# Patient Record
Sex: MALE | Race: WHITE | Employment: FULL TIME | URBAN - METROPOLITAN AREA
[De-identification: names, ages, dates, MRNs, and addresses within clinical notes are randomized per-mention and may not be internally consistent; named-entity substitution may affect disease eponyms.]

---

## 2017-08-24 ENCOUNTER — OFFICE VISIT (OUTPATIENT)
Dept: FAMILY MEDICINE CLINIC | Age: 42
End: 2017-08-24
Payer: COMMERCIAL

## 2017-08-24 VITALS
HEIGHT: 70 IN | SYSTOLIC BLOOD PRESSURE: 122 MMHG | WEIGHT: 272 LBS | HEART RATE: 86 BPM | BODY MASS INDEX: 38.94 KG/M2 | DIASTOLIC BLOOD PRESSURE: 68 MMHG | OXYGEN SATURATION: 98 % | TEMPERATURE: 98.2 F

## 2017-08-24 DIAGNOSIS — G89.29 CHRONIC RIGHT SHOULDER PAIN: Primary | ICD-10-CM

## 2017-08-24 DIAGNOSIS — M25.511 CHRONIC RIGHT SHOULDER PAIN: Primary | ICD-10-CM

## 2017-08-24 DIAGNOSIS — M67.90 NODULE OF TENDON SHEATH: ICD-10-CM

## 2017-08-24 DIAGNOSIS — Z76.89 ENCOUNTER TO ESTABLISH CARE: ICD-10-CM

## 2017-08-24 DIAGNOSIS — E66.9 OBESITY, UNSPECIFIED OBESITY SEVERITY, UNSPECIFIED OBESITY TYPE: ICD-10-CM

## 2017-08-24 DIAGNOSIS — Z00.00 HEALTH CARE MAINTENANCE: ICD-10-CM

## 2017-08-24 PROCEDURE — 99203 OFFICE O/P NEW LOW 30 MIN: CPT | Performed by: NURSE PRACTITIONER

## 2017-08-24 ASSESSMENT — ENCOUNTER SYMPTOMS
CONSTIPATION: 0
ABDOMINAL PAIN: 0
SHORTNESS OF BREATH: 0
COUGH: 0
DIARRHEA: 0
RESPIRATORY NEGATIVE: 1
WHEEZING: 0
ALLERGIC/IMMUNOLOGIC NEGATIVE: 1
GASTROINTESTINAL NEGATIVE: 1
EYES NEGATIVE: 1

## 2017-08-24 ASSESSMENT — PATIENT HEALTH QUESTIONNAIRE - PHQ9
SUM OF ALL RESPONSES TO PHQ QUESTIONS 1-9: 0
2. FEELING DOWN, DEPRESSED OR HOPELESS: 0
SUM OF ALL RESPONSES TO PHQ9 QUESTIONS 1 & 2: 0
1. LITTLE INTEREST OR PLEASURE IN DOING THINGS: 0

## 2017-09-05 ENCOUNTER — HOSPITAL ENCOUNTER (OUTPATIENT)
Age: 42
Setting detail: SPECIMEN
Discharge: HOME | End: 2017-09-05
Payer: COMMERCIAL

## 2017-09-05 DIAGNOSIS — Z00.00 HEALTH CARE MAINTENANCE: ICD-10-CM

## 2017-09-05 LAB
ANION GAP SERPL CALCULATED.3IONS-SCNC: 13 MMOL/L (ref 9–17)
BUN BLDV-MCNC: 8 MG/DL (ref 6–20)
BUN/CREAT BLD: ABNORMAL (ref 9–20)
CALCIUM SERPL-MCNC: 9.2 MG/DL (ref 8.6–10.4)
CHLORIDE BLD-SCNC: 109 MMOL/L (ref 98–107)
CHOLESTEROL/HDL RATIO: 3.2
CHOLESTEROL: 180 MG/DL
CO2: 25 MMOL/L (ref 20–31)
CREAT SERPL-MCNC: 0.78 MG/DL (ref 0.7–1.2)
GFR AFRICAN AMERICAN: >60 ML/MIN
GFR NON-AFRICAN AMERICAN: >60 ML/MIN
GFR SERPL CREATININE-BSD FRML MDRD: ABNORMAL ML/MIN/{1.73_M2}
GFR SERPL CREATININE-BSD FRML MDRD: ABNORMAL ML/MIN/{1.73_M2}
GLUCOSE BLD-MCNC: 98 MG/DL (ref 70–99)
HCT VFR BLD CALC: 39.4 % (ref 41–53)
HDLC SERPL-MCNC: 57 MG/DL
HEMOGLOBIN: 13.5 G/DL (ref 13.5–17.5)
LDL CHOLESTEROL: 100 MG/DL (ref 0–130)
MCH RBC QN AUTO: 31.3 PG (ref 26–34)
MCHC RBC AUTO-ENTMCNC: 34.4 G/DL (ref 31–37)
MCV RBC AUTO: 91.1 FL (ref 80–100)
PDW BLD-RTO: 14.7 % (ref 12.5–15.4)
PLATELET # BLD: 246 K/UL (ref 140–450)
PMV BLD AUTO: 8.4 FL (ref 6–12)
POTASSIUM SERPL-SCNC: 4.9 MMOL/L (ref 3.7–5.3)
RBC # BLD: 4.32 M/UL (ref 4.5–5.9)
SODIUM BLD-SCNC: 147 MMOL/L (ref 135–144)
TRIGL SERPL-MCNC: 114 MG/DL
VLDLC SERPL CALC-MCNC: NORMAL MG/DL (ref 1–30)
WBC # BLD: 5.7 K/UL (ref 3.5–11)

## 2017-09-08 DIAGNOSIS — M25.511 CHRONIC RIGHT SHOULDER PAIN: Primary | ICD-10-CM

## 2017-09-08 DIAGNOSIS — G89.29 CHRONIC RIGHT SHOULDER PAIN: Primary | ICD-10-CM

## 2017-09-11 ENCOUNTER — OFFICE VISIT (OUTPATIENT)
Dept: ORTHOPEDIC SURGERY | Age: 42
End: 2017-09-11
Payer: COMMERCIAL

## 2017-09-11 VITALS — BODY MASS INDEX: 38.95 KG/M2 | HEIGHT: 70 IN | WEIGHT: 272.05 LBS

## 2017-09-11 DIAGNOSIS — M75.41 SHOULDER IMPINGEMENT, RIGHT: Primary | ICD-10-CM

## 2017-09-11 PROCEDURE — 99243 OFF/OP CNSLTJ NEW/EST LOW 30: CPT | Performed by: ORTHOPAEDIC SURGERY

## 2017-09-11 PROCEDURE — 20610 DRAIN/INJ JOINT/BURSA W/O US: CPT | Performed by: ORTHOPAEDIC SURGERY

## 2017-09-11 RX ORDER — BUPIVACAINE HYDROCHLORIDE 2.5 MG/ML
2 INJECTION, SOLUTION INFILTRATION; PERINEURAL ONCE
Status: COMPLETED | OUTPATIENT
Start: 2017-09-11 | End: 2017-09-11

## 2017-09-11 RX ORDER — METHYLPREDNISOLONE ACETATE 80 MG/ML
80 INJECTION, SUSPENSION INTRA-ARTICULAR; INTRALESIONAL; INTRAMUSCULAR; SOFT TISSUE ONCE
Status: COMPLETED | OUTPATIENT
Start: 2017-09-11 | End: 2017-09-11

## 2017-09-11 RX ADMIN — METHYLPREDNISOLONE ACETATE 80 MG: 80 INJECTION, SUSPENSION INTRA-ARTICULAR; INTRALESIONAL; INTRAMUSCULAR; SOFT TISSUE at 14:36

## 2017-09-11 RX ADMIN — BUPIVACAINE HYDROCHLORIDE 5 MG: 2.5 INJECTION, SOLUTION INFILTRATION; PERINEURAL at 14:33

## 2017-09-11 ASSESSMENT — ENCOUNTER SYMPTOMS
DIARRHEA: 0
VOICE CHANGE: 0
WHEEZING: 0
SHORTNESS OF BREATH: 0
NAUSEA: 0
ABDOMINAL PAIN: 0
VOMITING: 0
CONSTIPATION: 0
TROUBLE SWALLOWING: 0
CHOKING: 0
COUGH: 0

## 2017-09-18 ENCOUNTER — HOSPITAL ENCOUNTER (OUTPATIENT)
Dept: PHYSICAL THERAPY | Facility: CLINIC | Age: 42
Setting detail: THERAPIES SERIES
Discharge: HOME | End: 2017-09-18
Payer: COMMERCIAL

## 2017-09-18 PROCEDURE — 97162 PT EVAL MOD COMPLEX 30 MIN: CPT

## 2017-09-18 PROCEDURE — 97110 THERAPEUTIC EXERCISES: CPT

## 2017-09-21 ENCOUNTER — HOSPITAL ENCOUNTER (OUTPATIENT)
Dept: PHYSICAL THERAPY | Facility: CLINIC | Age: 42
Setting detail: THERAPIES SERIES
Discharge: HOME | End: 2017-09-21
Payer: COMMERCIAL

## 2017-09-21 PROCEDURE — 97110 THERAPEUTIC EXERCISES: CPT

## 2017-09-25 ENCOUNTER — HOSPITAL ENCOUNTER (OUTPATIENT)
Dept: PHYSICAL THERAPY | Facility: CLINIC | Age: 42
Setting detail: THERAPIES SERIES
Discharge: HOME | End: 2017-09-25
Payer: COMMERCIAL

## 2017-09-25 PROCEDURE — 97110 THERAPEUTIC EXERCISES: CPT

## 2017-09-28 ENCOUNTER — HOSPITAL ENCOUNTER (OUTPATIENT)
Dept: PHYSICAL THERAPY | Facility: CLINIC | Age: 42
Setting detail: THERAPIES SERIES
Discharge: HOME | End: 2017-09-28
Payer: COMMERCIAL

## 2017-09-28 PROCEDURE — 97110 THERAPEUTIC EXERCISES: CPT

## 2017-10-02 ENCOUNTER — HOSPITAL ENCOUNTER (OUTPATIENT)
Dept: PHYSICAL THERAPY | Facility: CLINIC | Age: 42
Setting detail: THERAPIES SERIES
Discharge: HOME | End: 2017-10-02
Payer: COMMERCIAL

## 2017-10-02 PROCEDURE — 97140 MANUAL THERAPY 1/> REGIONS: CPT

## 2017-10-02 PROCEDURE — 97016 VASOPNEUMATIC DEVICE THERAPY: CPT

## 2017-10-02 PROCEDURE — 97110 THERAPEUTIC EXERCISES: CPT

## 2017-10-05 ENCOUNTER — APPOINTMENT (OUTPATIENT)
Dept: PHYSICAL THERAPY | Facility: CLINIC | Age: 42
End: 2017-10-05
Payer: COMMERCIAL

## 2017-10-09 ENCOUNTER — HOSPITAL ENCOUNTER (OUTPATIENT)
Dept: PHYSICAL THERAPY | Facility: CLINIC | Age: 42
Setting detail: THERAPIES SERIES
Discharge: HOME | End: 2017-10-09
Payer: COMMERCIAL

## 2017-10-09 PROCEDURE — 97016 VASOPNEUMATIC DEVICE THERAPY: CPT

## 2017-10-09 PROCEDURE — 97110 THERAPEUTIC EXERCISES: CPT

## 2017-10-09 NOTE — FLOWSHEET NOTE
[] Job Joan       Outpatient Physical        Therapy       [address]       Phone: [phone]       Fax: [phone] [x] [facility] at [address]       Phone: [phone]       Fax: [phone] [] [place]h. [facility]5 [facility]  [address] [place] [address]   Phone: [phone]   Fax:  [phone]     Physical Therapy Daily Treatment Note    Date:  10/9/2017  Patient Name:  Cristóbal Hardwick    :  1975  MRN: [id number]  Physician: Dr. Joel Lyon: Capri Kumar Diagnosis: R shoulder impingement                                          Rehab Codes: L53.136, M25.611, R29.3, M62.81  Onset Date: 2017                                         Next 's appt: 10/26/17  Visit# / total visits:   Cancels/No Shows: 0    Subjective:    Pain:  [x] Yes  [] No Location: R shoulder Pain Rating: (0-10 scale) 3/10  Pain altered Tx:  [x] No  [] Yes  Action:  Comments: Patient continues to note R shoulder pain over the top of the shoulder today. Pt reports he starts his new job today. Pt reports he felt fine following the last session. Pt notes since starting therapy, there have been more days where he has less pain or no pain when waking up and it takes longer for him to have pain during the day.     Upper shoulder  Objective:  Modalities:                        Vaso pneumatic 15', min compression  Precautions:  Exercises:  Exercise Reps/ Time Weight/ Level Comments   UBE 6' L1    Sitting      Upper trap stretch 3x30\"  1x following seated scapular retractions   Scapular retractions 10x  Increased RUT discomfort 10/9   Supine      PROM 6.5'  Good tolerance to PROM   Limited IR at 90*   Pec stretch 3x30'  Supine on mat- arms in about 90 deg abd   Rhythmic stabilization 3x20\" holds 1# for flexion IR/ER at 60 deg abduction- weaker with resisting IR  Flexion  Progressed 10/9   Serratus punches 3x10 3# Below 90 deg  Progressed 9/28   Sidelying       Sleeper stretch-R 3x30\"  Added 10/9   Scapular retractions  10x5\" holds     ER 2x10 1# Minimal range- avoiding pain   Abduction  2x10 AA at R scapula Pain-free range   Horizontal abduction 2x10 A Pain-free range  Added 9/28   Theraband   Progressed 9/28   Lat pulldowns 2x15 Green     Rows  2x15 Green     B ER 2x15 Green  Back against wall   Depressions 2x15 blue Added 10/9   Standing      Wall push-ups with plus 15x  Added 9/28   Other: trialed prone scapular retractions with increased discomfort over the R upper shoulder; 4/10 at end of session        Specific Instructions for next treatment:    Treatment Charges: Mins Units   []  Modalities: CP     [x]  Ther Exercise 25 2   []  Manual Therapy     []  Ther Activities     []  Aquatics     [x]  Vasocompression 15 1   []  Other     Total Treatment time 40 3       Assessment: [x] Progressing toward goals. Pt notes 4/10 R shoulder pain at the end of the session which was addressed with vasocompression. Pt continues to demonstrate limited R shoulder ROM in both flexion and abduction secondary to pain. Pt continues to demonstrate good PROM in both flexion and abduction with tightness noted with IR. Pt notes continued increase in R shoulder discomfort with sitting and prone scapular retractions, however, notes some improvement when completing upper trap stretch. Pt continues to benefit from skilled therapeutic interventions in order to improve R shoulder pain, ROM, strength, and posture in order to progress to prior level of activity. [] No change. [x] Other: See STG    Short term goals: MEET IN 6 VISITS Status   Pain: Pt will report less than or equal to 4/10 R shoulder pain with therapeutic ROM and strengthening exercises in order to improve tolerance to completion of ADLs.  MET 10/9/17   ROM: Pt will demonstrate R shoulder AROM to greater than or equal to 150

## 2017-10-12 ENCOUNTER — APPOINTMENT (OUTPATIENT)
Dept: PHYSICAL THERAPY | Facility: CLINIC | Age: 42
End: 2017-10-12
Payer: COMMERCIAL

## 2017-10-12 ENCOUNTER — HOSPITAL ENCOUNTER (OUTPATIENT)
Dept: PHYSICAL THERAPY | Facility: CLINIC | Age: 42
Setting detail: THERAPIES SERIES
Discharge: HOME | End: 2017-10-12
Payer: COMMERCIAL

## 2017-10-12 PROCEDURE — 97110 THERAPEUTIC EXERCISES: CPT

## 2017-10-12 PROCEDURE — 97016 VASOPNEUMATIC DEVICE THERAPY: CPT

## 2017-10-12 NOTE — FLOWSHEET NOTE
[] [facility]       Outpatient Physical        Therapy       [address]       Phone: [phone]       Fax: [phone] [x] [facility] at [address] [facility]       Phone: [phone]       Fax: [phone] [] Samantha. [facility]5 [facility]  [address] [place] [address]   Phone: [phone]   Fax:  [phone]     Physical Therapy Daily Treatment Note    Date:  10/12/2017  Patient Name:  Celestino Lynch    :  1975  MRN: [id number]  Physician: Dr. Alix Cotton: Emily Maddox Diagnosis: R shoulder impingement                                          Rehab Codes: R75.702, M25.611, R29.3, M62.81  Onset Date: 2017                                         Next 's appt: 10/26/17  Visit# / total visits:   Cancels/No Shows: 0    Subjective:    Pain:  [x] Yes  [] No Location: R shoulder Pain Rating: (0-10 scale) 2/10  Pain altered Tx:  [x] No  [] Yes  Action:  Comments: Patient reports slight decrease in pain.      Upper shoulder  Objective:  Modalities:                        Vaso pneumatic 15', min compression, 34 degrees after exercises seated with pillow under arm  Precautions:  Exercises:  Exercise Reps/ Time Weight/ Level Comments   UBE 6' L1 3 fwd/ 3 bckwd         Sitting      Upper trap stretch 3x30\"  1x following seated scapular retractions   Scapular retractions 10x  Increased RUT discomfort 10         Supine      PROM 6.5'  Good tolerance to PROM   Limited IR at 90*   Pec stretch 3x30'  Supine on mat- arms in about 90 deg abd   Rhythmic stabilization 3x20\" holds 1# for flexion and IR/ER IR/ER at 60 deg abduction- weaker with resisting IR  Flexion  Progressed 10/9   Serratus punches 3x10 3# Below 90 deg  Progressed          Sidelying       Sleeper stretch-R 3x30\"  Added 10/9   Scapular retractions  10x5\" holds     ER 2x10 1# Minimal range- avoiding pain   Abduction  2x10 AA at R scapula Pain-free range   Horizontal abduction 2x10 A Pain-free range  Added 9/28         Theraband   Progressed 9/28   Lat pulldowns 2x15 Green     Rows  2x15 Green     B ER 2x15 Green  Back against wall   Depressions 2x15 blue Added 10/9         Standing      Wall push-ups with plus 15x  Added 9/28   Other: Pt notes increased pain during ER TB exercises, able to complete exercises. Specific Instructions for next treatment:    Treatment Charges: Mins Units   []  Modalities: CP     [x]  Ther Exercise 29 2   []  Manual Therapy     []  Ther Activities     []  Aquatics     [x]  Vasocompression 15 1   []  Other     Total Treatment time 44 3       Assessment: [x] Progressing toward goals. Verbal and tactile cues to relax upper traps during scapular retraction exercises with good patient carry over to decrease pain . Pt reports increased ROM SL hor AB and overall improvement in R shoulder during there ex. No change. [] Other:     Short term goals: MEET IN 6 VISITS Status   Pain: Pt will report less than or equal to 4/10 R shoulder pain with therapeutic ROM and strengthening exercises in order to improve tolerance to completion of ADLs. MET 10/9/17   ROM: Pt will demonstrate R shoulder AROM to greater than or equal to 150 degrees flexion and 110 deg abduction with reports of less than or equal to 4/10 pain in order to improve ability to complete overhead reaching and lifting tasks. Ongoing 10/9/17 132 deg flexion (pain)  98 deg abduction (pain)   Strength: Pt will demonstrate 10 scapular retractions with symmetrical movement in order to demonstrate improved scapulohumeral mechanics and improve posture when completing ov MET 10/9/17 (however, aggravated upper trap/supraspinatus 4-5/10)   Home Exercise Program: Pt will demonstrate independence with HEP.  MET 10/9   Demonstrates knowledge of fall prevention Not needed    Longer term goals: MEET IN 12 VISITS     Pain: Pt will report less than or equal to 1-2/10 R shoulder pain with overhead reaching, lifting, and ADLs along with recreational activities in order to progress to prior level of activity. Ongoing   ROM: Pt will demonstrate R shoulder AROM to greater than or equal to 160 degrees flexion, 130 deg abduction, and IR to midline of lower thoracic spine with reports of less than or equal to 1-2/10 pain in order to improve ability to complete overhead reaching and lifting tasks. Ongoing   Strength: Pt will demonstrate greater than or equal to 4+/5 R shoulder strength and 4/5 parascapular strength in order to improve stability and mechanics of the R shoulder when completing overhead and recreational activities. Ongoing   Outcome Score: Pt will score greater than or equal to 92.5% max function on the UEFS in order to demonstrate improved function Ongoing          Patient goals: to get rid of the pain and learn how to maintain my hobby with little to no pain         Pt. Education:  [] Yes  [] No  [x] Reviewed Prior HEP/Ed  Method of Education: [] Verbal  [] Demo  [] Written:   Comprehension of Education:  [] Verbalizes understanding. [] Demonstrates understanding. [] Needs review. [x] Demonstrates/verbalizes HEP/Ed previously given. Plan: [x] Continue per plan of care.    [] Other:      Time In: 6:40pm         Time Out: 7:30pm    Electronically signed by:  Katie Umaña PTA

## 2017-10-16 ENCOUNTER — APPOINTMENT (OUTPATIENT)
Dept: PHYSICAL THERAPY | Facility: CLINIC | Age: 42
End: 2017-10-16
Payer: COMMERCIAL

## 2017-10-17 ENCOUNTER — HOSPITAL ENCOUNTER (OUTPATIENT)
Dept: PHYSICAL THERAPY | Facility: CLINIC | Age: 42
Setting detail: THERAPIES SERIES
Discharge: HOME | End: 2017-10-17
Payer: COMMERCIAL

## 2017-10-17 PROCEDURE — 97016 VASOPNEUMATIC DEVICE THERAPY: CPT

## 2017-10-17 PROCEDURE — 97110 THERAPEUTIC EXERCISES: CPT

## 2017-10-17 NOTE — FLOWSHEET NOTE
[] [facility]       Outpatient Physical        Therapy       [address]       Phone: [phone]       Fax: [phone] [x] [facility] at [address] [facility]       Phone: [phone]       Fax: [phone] [] [facility]. [facility]  [address] [facility]   Phone: [phone]   Fax:  [phone]     Physical Therapy Daily Treatment Note    Date:  10/17/2017  Patient Name:  Louanna Favre    :  1975  MRN: [id number]  Physician: Dr. Mahin Still: Viviana Trevizo Diagnosis: R shoulder impingement                                          Rehab Codes: E95.396, M25.611, R29.3, M62.81  Onset Date: 2017                                         Next 's appt: 10/26/17  Visit# / total visits: 8/12  Cancels/No Shows: 0    Subjective:    Pain:  [x] Yes  [] No Location: R shoulder Pain Rating: (0-10 scale) 3/10  Pain altered Tx:  [x] No  [] Yes  Action:  Comments: Patient reports the last few days his R shoulder has been more painful. Pt notes he hasn't done anything out of the ordinary for this to occur. Pt states he was attempting to complete his exercises at home. Pt notes his new job is more of a sitting job.       Upper shoulder  Objective:  Modalities:                        Vaso pneumatic 15', min compression, 34 degrees after exercises seated with pillow under arm  Precautions:  Exercises:  Exercise Reps/ Time Weight/ Level Comments   UBE 6' L2 3 fwd/ 3 bckwd         Sitting      Upper trap stretch 3x30\"  R only   Scapular retractions 10x           Supine      PROM 15'  Good tolerance to PROM   Cervical distraction: decreased R shoulder pain   Pec stretch x  Supine on mat- arms in about 90 deg abd  With PROM   Cervical retractions 10x5\"  Added 10/17   Rhythmic stabilization 3x20\" holds 1# for flexion and IR/ER IR/ER at 60 deg to get rid of the pain and learn how to maintain my hobby with little to no pain         Pt. Education:  [] Yes  [] No  [x] Reviewed Prior HEP/Ed  Method of Education: [] Verbal  [] Demo  [] Written:   Comprehension of Education:  [] Verbalizes understanding. [] Demonstrates understanding. [] Needs review. [x] Demonstrates/verbalizes HEP/Ed previously given. Plan: [x] Continue per plan of care.    [] Other:      Time In: 6:46pm         Time Out: 7:51pm    Electronically signed by:  Neftaly Mondragon, PT

## 2017-10-19 ENCOUNTER — APPOINTMENT (OUTPATIENT)
Dept: PHYSICAL THERAPY | Facility: CLINIC | Age: 42
End: 2017-10-19
Payer: COMMERCIAL

## 2017-10-19 ENCOUNTER — HOSPITAL ENCOUNTER (OUTPATIENT)
Dept: PHYSICAL THERAPY | Facility: CLINIC | Age: 42
Setting detail: THERAPIES SERIES
Discharge: HOME | End: 2017-10-19
Payer: COMMERCIAL

## 2017-10-19 PROCEDURE — 97110 THERAPEUTIC EXERCISES: CPT

## 2017-10-19 PROCEDURE — 97016 VASOPNEUMATIC DEVICE THERAPY: CPT

## 2017-10-19 NOTE — FLOWSHEET NOTE
deg  Progressed 9/28         Sidelying       Sleeper stretch-R 3x30\"  Added 10/9   Scapular retractions  10x5\" holds     ER 2x10 1# Minimal range- avoiding pain   Abduction  2x10 AA at R scapula Pain-free range   Horizontal abduction 2x10 A Pain-free range  Added 9/28         Theraband   Progressed 10/17   Lat pulldowns 2x10 blue    Rows  2x10 blue Increased 10.19.17   B ER 2x10 Green  Back against wall   Depressions 2x15 blue Added 10/9   Don't shoots 10x  Added 10/17 (slight increase in pain, greater muscle fatigue noted)   Standing      Wall push-ups with plus 15x  Added 9/28   Other: Pt denies N/T in the RUE except when sleeping on the arm wrong. Pt reports it is very rare for the R shoulder to have pain radiate down the arm. NEG [facility] testing, bilateral  Completed exercises in BOLD    Specific Instructions for next treatment: trial cervical distraction to assess response to UE pain    Treatment Charges: Mins Units   []  Modalities: CP     [x]  Ther Exercise 35 2   []  Manual Therapy     []  Ther Activities     []  Aquatics     [x]  Vasocompression 15 1   []  Other     Total Treatment time 50 3       Assessment: [x] Progressing toward goals. Pain noted at end of range in flexion and abduction with all activities. Patient demonstrates improved postural alignment when completing standing exercises against the wall. Manual therapy applied to R shoulder to increase ROM with facial grimacing at end of range. Cervical distraction in supine to decrease radicular pain into R arm while sleeping. Vaso compression applied at end of session in seated position with arm elevated on pillow to decrease pain. No change. [] Other:     Short term goals: MEET IN 6 VISITS Status   Pain: Pt will report less than or equal to 4/10 R shoulder pain with therapeutic ROM and strengthening exercises in order to improve tolerance to completion of ADLs.  MET 10/9/17   ROM: Pt will demonstrate R shoulder compliant with HEP     Plan: [x] Continue per plan of care.    [] Other:      Time In: 6:53am         Time Out: 7:05am    Electronically signed by:  Tamra Tamez PTA

## 2017-10-24 ENCOUNTER — HOSPITAL ENCOUNTER (OUTPATIENT)
Dept: PHYSICAL THERAPY | Facility: CLINIC | Age: 42
Setting detail: THERAPIES SERIES
Discharge: HOME | End: 2017-10-24
Payer: COMMERCIAL

## 2017-10-24 PROCEDURE — 97016 VASOPNEUMATIC DEVICE THERAPY: CPT

## 2017-10-24 PROCEDURE — 97110 THERAPEUTIC EXERCISES: CPT

## 2017-10-24 NOTE — FLOWSHEET NOTE
[] Pamela Meneses       Outpatient Physical        Therapy       [address]       Phone: [phone]       Fax: [phone] [x] [facility] at [address] [facility]       Phone: [phone]       Fax: [phone] [] [facility]  [address] [facility]   Phone: [phone]   Fax:  [phone]     Physical Therapy Daily Treatment Note    Date:  10/24/2017  Patient Name:  Neeraj Bryant    :  1975  MRN: [id number]  Physician: Dr. Marti Deep: Leticia Knobel Diagnosis: R shoulder impingement                                          Rehab Codes: V14.806, M25.611, R29.3, M62.81  Onset Date: 2017                                         Next 's appt: 10/26/17  Visit# / total visits: 10/12  Cancels/No Shows: 0    Subjective:    Pain:  [x] Yes  [] No Location: R shoulder Pain Rating: (0-10 scale) 1/10  Pain altered Tx:  [x] No  [] Yes  Action:  Comments: Patient reports decreased pain and states he is feeling much better. Reports woke up both Sat and Sun morning with no pain which is rare-- believes it may be from sleeping on a better mattress in a hotel room.     Upper shoulder  Objective:  Modalities:                        Vaso pneumatic 15', min compression, 34 degrees after exercises seated with pillow under arm  Precautions:  Exercises:  Exercise Reps/ Time Weight/ Level Comments   UBE 6' L2 3 fwd/ 3 bckwd         Sitting      Upper trap stretch 3x30\"  R only   Scapular retractions 10x           Supine      PROM 10'  Only completed Cervical distraction: decreased R shoulder pain   Pec stretch x  Supine on mat- arms in about 90 deg abd- added rolled towel under upper back 10/24  With PROM   Cervical retractions 10x5\"  Added 10/17   Rhythmic stabilization 3x20\" holds 1# for flexion and IR/ER IR/ER at 60 deg abduction- weaker with resisting IR  Flexion  Progressed 10/9   Serratus punches 3x10 3# Below 90 deg  Progressed 9/28         Sidelying       Sleeper stretch-R 3x30\"  Added 10/9   Scapular retractions  10x5\" holds     ER 2x10 1# Minimal range- avoiding pain   Abduction  2x10 AA at R scapula Pain-free range   Horizontal abduction 2x10 A Pain-free range  Added 9/28         Theraband   Progressed 10/17   Lat pulldowns 2x10 blue    Rows  2x10 blue Increased 10.19.17   B ER 2x10 Green  Back against wall   Depressions 2x15 blue Added 10/9   Don't shoots 10x  Added 10/17 (slight increase in pain, greater muscle fatigue noted)   STANDING      Wall push-ups with plus 15x  Added 9/28   Other:  Completed exercises in BOLD    Specific Instructions for next treatment: continue with cervical distraction to assess response to UE pain,     Treatment Charges: Mins Units   []  Modalities: CP     [x]  Ther Exercise 40 3   []  Manual Therapy     []  Ther Activities     []  Aquatics     [x]  Vasocompression 15 1   []  Other     Total Treatment time 55 4       Assessment: [x] Progressing toward goals. Pt recalls exercises well and only has c/o increased pain during ABduction. Displays improved ROM during there ex. During rhythmic stabilization patient displays increased strength against resistance in ER. No change. [] Other:     Short term goals: MEET IN 6 VISITS Status   Pain: Pt will report less than or equal to 4/10 R shoulder pain with therapeutic ROM and strengthening exercises in order to improve tolerance to completion of ADLs. MET 10/9/17   ROM: Pt will demonstrate R shoulder AROM to greater than or equal to 150 degrees flexion and 110 deg abduction with reports of less than or equal to 4/10 pain in order to improve ability to complete overhead reaching and lifting tasks.  Ongoing 10/9/17 132 deg flexion (pain)  98 deg abduction (pain)   Strength: Pt will demonstrate 10 scapular retractions with symmetrical movement in order to demonstrate improved scapulohumeral mechanics and improve posture when completing ov MET 10/9/17 (however, aggravated upper trap/supraspinatus 4-5/10)   Home Exercise Program: Pt will demonstrate independence with HEP. MET 10/9   Demonstrates knowledge of fall prevention Not needed    Longer term goals: MEET IN 12 VISITS     Pain: Pt will report less than or equal to 1-2/10 R shoulder pain with overhead reaching, lifting, and ADLs along with recreational activities in order to progress to prior level of activity. Ongoing   ROM: Pt will demonstrate R shoulder AROM to greater than or equal to 160 degrees flexion, 130 deg abduction, and IR to midline of lower thoracic spine with reports of less than or equal to 1-2/10 pain in order to improve ability to complete overhead reaching and lifting tasks. Ongoing   Strength: Pt will demonstrate greater than or equal to 4+/5 R shoulder strength and 4/5 parascapular strength in order to improve stability and mechanics of the R shoulder when completing overhead and recreational activities. Ongoing   Outcome Score: Pt will score greater than or equal to 92.5% max function on the UEFS in order to demonstrate improved function Ongoing          Patient goals: to get rid of the pain and learn how to maintain my hobby with little to no pain         Pt. Education:  [x] Yes  [] No  [] Reviewed Prior HEP/Ed  Method of Education: [x] Verbal  [] Demo  [] Written:   Comprehension of Education:  [] Verbalizes understanding. [] Demonstrates understanding. [] Needs review. [] Demonstrates/verbalizes HEP/Ed previously given. States he is compliant with HEP     Plan: [x] Continue per plan of care.    [] Other:      Time In: 5:30pm        Time Out: 6:30pm    Electronically signed by:  Germaine Henson PTA

## 2017-10-26 ENCOUNTER — OFFICE VISIT (OUTPATIENT)
Dept: ORTHOPEDIC SURGERY | Age: 42
End: 2017-10-26
Payer: COMMERCIAL

## 2017-10-26 ENCOUNTER — HOSPITAL ENCOUNTER (OUTPATIENT)
Dept: PHYSICAL THERAPY | Facility: CLINIC | Age: 42
Setting detail: THERAPIES SERIES
Discharge: HOME | End: 2017-10-26
Payer: COMMERCIAL

## 2017-10-26 VITALS — WEIGHT: 270.6 LBS | HEIGHT: 70 IN | BODY MASS INDEX: 38.74 KG/M2

## 2017-10-26 DIAGNOSIS — M75.41 SHOULDER IMPINGEMENT, RIGHT: Primary | ICD-10-CM

## 2017-10-26 PROCEDURE — 97016 VASOPNEUMATIC DEVICE THERAPY: CPT

## 2017-10-26 PROCEDURE — 97110 THERAPEUTIC EXERCISES: CPT

## 2017-10-26 PROCEDURE — 99213 OFFICE O/P EST LOW 20 MIN: CPT | Performed by: ORTHOPAEDIC SURGERY

## 2017-10-26 ASSESSMENT — ENCOUNTER SYMPTOMS
WHEEZING: 0
CHOKING: 0
CONSTIPATION: 0
DIARRHEA: 0
TROUBLE SWALLOWING: 0
COUGH: 0
VOMITING: 0
ABDOMINAL PAIN: 0
NAUSEA: 0
VOICE CHANGE: 0
SHORTNESS OF BREATH: 0

## 2017-10-26 NOTE — PROGRESS NOTES
[] [facility]       Outpatient Physical                Therapy         [address]       Phone: [phone]       Fax: [phone] [x] [facility] at [address] [facility]       Phone: [phone]       Fax: [phone] [] Samantha. [address] [place] [address]     [address] [facility]     Phone: [phone]     Fax:  [phone]     Physical Therapy Progress Note    Date: 10/26/2017      Patient: Eddie Hagen  : 1975  MRN: [id number]    Physician: Dr. Carmona : [address] [place] [address] Dr Diagnosis: R shoulder impingement                                          Rehab [address], M25.611, R29.3, M62.81  Onset Date: 2017                                         Next 's appt: 10/26/17  Visit# / total visits: 10/12                                      Cancels/No Shows: 0     Subjective:    Pain:  [x] Yes  [] No                    Location: R shoulder             Pain Rating: (0-10 scale) 1/10  Pain altered Tx:  [x] No  [] Yes  Action:  Comments: Patient reports decreased pain and states he is feeling much better. Reports woke up both Sat and Sun morning with no pain which is rare-- believes it may be from sleeping on a better mattress in a hotel room.       Assessment: Pt continues to progress strengthening with good tolerance. Pt did have an episode of R upper trap pain that was resolved with manual cervical traction indicating potential radicular symptoms. However, there remains to be continued impingement like symptoms in the R shoulder that are improving. Short term goals: MEET IN 6 VISITS Status   Pain: Pt will report less than or equal to 4/10 R shoulder pain with therapeutic ROM and strengthening exercises in order to improve tolerance to completion of ADLs.  MET 10/9/17   ROM: Pt will demonstrate R shoulder AROM to greater than Program                     Dexamethasone Sodium  [x] Manual Therapy             Phosphate 40-80 mAmin  [] Aquatic Therapy                   [x] Vasocompression/    [] Other:            Game Ready    Patient Status:     [x] Continue per initial plan of care. [] Additional visits necessary. [] Other:         Electronically signed by El Black PT on 10/26/2017 at 6:28 AM      If you have any questions or concerns, please don't hesitate to call.   Thank you for your referral.

## 2017-10-26 NOTE — PROGRESS NOTES
[address] [place] [address]  Dept: [phone]  Dept Fax: [phone]        Ambulatory Follow Up      Subjective:   Nitin Mcmahon is a 42y.o. year old male who presents to our office today for routine followup regarding his   1. Shoulder impingement, right    . Chief Complaint   Patient presents with    Shoulder Pain       HPI Michel Boogie returns today for Recheck of his right shoulder. He had a corticosteroid injection on 9/11/2017 and has been in formal physical therapy for his right shoulder. He notes his symptoms are 75% better. Review of Systems   Constitutional: Negative for fever. HENT: Negative for ear discharge, ear pain, hearing loss, tinnitus, trouble swallowing and voice change. Respiratory: Negative for cough, choking, shortness of breath and wheezing. Cardiovascular: Negative for chest pain, palpitations and leg swelling. Gastrointestinal: Negative for abdominal pain, constipation, diarrhea, nausea and vomiting. Musculoskeletal: Positive for arthralgias. Neurological: Negative for dizziness. I have reviewed the CC, HPI, ROS, PMH, FHX, Social History. I agree with the documentation provided by other staff, residents and have reviewed their documentation prior to providing my signature indicating agreement. Objective :   Ht 5' 10\" (1.778 m)   Wt 270 lb 9.6 oz (122.7 kg)   BMI 38.83 kg/m²  Body mass index is 38.83 kg/m². General: Nitin Mcmahon is a 42 y.o. male who is alert and oriented and sitting comfortably in our office. Ortho Exam  MS: Patient has full range of motion of the right shoulder without our deformity. Rotator cuff strength is intact and symmetric bilaterally. Neuro: alert. oriented  Eyes: Extra-ocular muscles intact  Mouth: Oral mucosa moist. No perioral lesions  Pulm: Respirations unlabored and regular.   Skin: warm, well perfused  Psych:   Patient has good fund of knowledge and displays understanging of exam, diagnosis, and plan. Assessment:      1. Shoulder impingement, right       Plan:         Patient is going to continue with a home exercise program and physical therapy. He could repeat an injection in the future should wait at least 4 months between injections. I plan to see him back as needed. Follow up:Return if symptoms worsen or fail to improve. No orders of the defined types were placed in this encounter. No orders of the defined types were placed in this encounter. I have reviewed and made changes accordingly to the work scribed by Pablo Riggins LPN. The documentation accurately reflects work and decisions made by me. I have also reviewed documentation completed by clinical staff.     Arie Kaur DO, [address]  11/5/2017 3:45 PM      Electronically signed by Benson Clarke DO, [facility] on 11/5/2017 at 3:44 PM

## 2017-10-26 NOTE — FLOWSHEET NOTE
aggravated upper trap/supraspinatus 4-5/10)   Home Exercise Program: Pt will demonstrate independence with HEP. MET 10/9   Demonstrates knowledge of fall prevention Not needed    Longer term goals: MEET IN 12 VISITS     Pain: Pt will report less than or equal to 1-2/10 R shoulder pain with overhead reaching, lifting, and ADLs along with recreational activities in order to progress to prior level of activity. Ongoing   ROM: Pt will demonstrate R shoulder AROM to greater than or equal to 160 degrees flexion, 130 deg abduction, and IR to midline of lower thoracic spine with reports of less than or equal to 1-2/10 pain in order to improve ability to complete overhead reaching and lifting tasks. Ongoing   Strength: Pt will demonstrate greater than or equal to 4+/5 R shoulder strength and 4/5 parascapular strength in order to improve stability and mechanics of the R shoulder when completing overhead and recreational activities. Ongoing   Outcome Score: Pt will score greater than or equal to 92.5% max function on the UEFS in order to demonstrate improved function Ongoing          Patient goals: to get rid of the pain and learn how to maintain my hobby with little to no pain         Pt. Education:  [x] Yes  [] No  [] Reviewed Prior HEP/Ed  Method of Education: [x] Verbal  [] Demo  [] Written:   Comprehension of Education:  [x] Verbalizes understanding. [x] Demonstrates understanding. [] Needs review. [] Demonstrates/verbalizes HEP/Ed previously given. Plan: [x] Continue per plan of care.    [] Other:      Time In: 5:45pm        Time Out: 6:45pm    Electronically signed by:  Ronak Godoy PTA

## 2017-10-30 ENCOUNTER — HOSPITAL ENCOUNTER (OUTPATIENT)
Dept: PHYSICAL THERAPY | Facility: CLINIC | Age: 42
Setting detail: THERAPIES SERIES
Discharge: HOME | End: 2017-10-30
Payer: COMMERCIAL

## 2017-10-30 PROCEDURE — 97110 THERAPEUTIC EXERCISES: CPT

## 2017-10-30 NOTE — FLOWSHEET NOTE
[] [place]       Outpatient Physical        Therapy       [address]       Phone: [phone]       Fax: [phone] [x] [facility] at [address]       Phone: [phone]       Fax: [phone] [] [place]  [address] [place] [address]   Phone: [phone]   Fax:  [phone]     Physical Therapy Daily Treatment Note    Date:  10/30/2017  Patient Name:  Diamond Ocasio    :  1975  MRN: [id number]  Physician: Dr. Bermudez Mail: Trina Galeana Diagnosis: R shoulder impingement                                          Rehab Codes: S97.770, M25.611, R29.3, M62.81  Onset Date: 2017                                         Next 's appt: 10/26/17  Visit# / total visits:   Cancels/No Shows: 0    Subjective:    Pain:  [x] Yes  [] No Location: R shoulder Pain Rating: (0-10 scale) 1/10  Pain altered Tx:  [x] No  [] Yes  Action:  Comments1: Pt reports the R shoulder gets to about 3/10 throughout the day and believes it is more weakness than pain. Pt reports he has not tried any of his role playing activities yet. Pt reports moving arm out to the side is still challenging. Pt reports has not been able to complete his hobby but was able to throw a ball without an increase pain. Pt reports sitting at his desk isn't too bad.      Upper shoulder  Objective:  Modalities:                        Vaso pneumatic 15', min compression, 34 degrees after exercises seated with pillow under arm- HELD  CP to R shoulder in sitting, 15'  Precautions:  Exercises:  Exercise Reps/ Time Weight/ Level Comments   UBE 6' L2 3 fwd/ 3 bckwd         Sitting      Upper trap stretch 3x30\"  R only   Scapular retractions 10x           Supine      PROM x  Could ROM without pain with full ABduction, ER, or flexion- continues to be limited in IR at updated HEP and was advised to complete all exercises at least 2x/week while completing a few individual exercises daily in order to maintain and progress strength and ROM. Pt will be discharged this date with instructions to continue HEP and to monitor R shoulder symptoms. [x] Other: Re-assessment 10/30/17  R shoulder AROM:   Flexion: 160 (pain at end range- 2/10) [previosuly 140 deg with pain]  Abduction: 135 deg (pain at the top is the most but throughout the full range, 3/10 at top) [previously 95 deg with pain]  IR: T11 (worst)- over [facility] joint- L arm- mid thoracic  - Pain has improved with motions in all planes  R shoulder strength:  Flexion: 4+/5  Abduction: 4/5   IR: 4+/5  ER: 4+/5  Supraspinatus: 4+/5  Teres minor: 4+/5  B parascapular strength:  Rhomboids: 4+/5, R; 5/5, L  Middle trap: 4/5, R; 4+/5, L  Lower trap: 4/5, R; 4+/5, L    Special Tests: Drop arm neg- no presence of head tilt  3/10, max increase R shoulder pain    UEFS: 73/80, 71.25% max function    Short term goals: MEET IN 6 VISITS Status   Pain: Pt will report less than or equal to 4/10 R shoulder pain with therapeutic ROM and strengthening exercises in order to improve tolerance to completion of ADLs. MET 10/9/17   ROM: Pt will demonstrate R shoulder AROM to greater than or equal to 150 degrees flexion and 110 deg abduction with reports of less than or equal to 4/10 pain in order to improve ability to complete overhead reaching and lifting tasks. MET 10/30/17   Strength: Pt will demonstrate 10 scapular retractions with symmetrical movement in order to demonstrate improved scapulohumeral mechanics and improve posture when completing ov MET 10/30/17    Home Exercise Program: Pt will demonstrate independence with HEP.  MET 10/9   Demonstrates knowledge of fall prevention Not needed    Longer term goals: MEET IN 12 VISITS     Pain: Pt will report less than or equal to 1-2/10 R shoulder pain with overhead reaching, lifting, and ADLs along with recreational activities in order to progress to prior level of activity. Intermittently met 10/30/17  (reaching overhead not too bad 2/10; Throwing- small spike in pain-  morning about 0 and with the throw about 2/10)   ROM: Pt will demonstrate R shoulder AROM to greater than or equal to 160 degrees flexion, 130 deg abduction, and IR to midline of lower thoracic spine with reports of less than or equal to 1-2/10 pain in order to improve ability to complete overhead reaching and lifting tasks. Partially met 10/30/17   Strength: Pt will demonstrate greater than or equal to 4+/5 R shoulder strength and 4/5 parascapular strength in order to improve stability and mechanics of the R shoulder when completing overhead and recreational activities. MET 10/30/17   Outcome Score: Pt will score greater than or equal to 92.5% max function on the UEFS in order to demonstrate improved function Not met 10/30/17  (73/80, 91.25% max function)          Patient goals: to get rid of the pain and learn how to maintain my hobby with little to no pain         Pt. Education:  [x] Yes  [] No  [x] Reviewed Prior HEP/Ed  Method of Education: [x] Verbal  [] Demo  [x] Written: sidelying and band exercises; red, yellow, and black bands provided  Comprehension of Education:  [x] Verbalizes understanding. [x] Demonstrates understanding. [] Needs review. [x] Demonstrates/verbalizes HEP/Ed previously given. Plan: [] Continue per plan of care.    [x] Other: discharge 10/30/17      Time In: 6:40 am        Time Out:  7:49 am    Electronically signed by:  Vikas Nelson PT

## 2017-10-30 NOTE — DISCHARGE SUMMARY
[] Afshin [place]        Outpatient Physical                Therapy       [address]       Phone: [phone]       Fax: [phone] [x] [facility] at [address] [facility]       Phone: [phone]       Fax: [phone] [] [place]. [address] [place] [address]     Phone: [phone]     Fax:  [phone]     Physical Therapy Discharge Note    Date: 10/30/2017      Patient: Kaylyn Hale  : 1975  MRN: [id number]    Physician: Dr. Keitha Duverney: [address] [facility]  Diagnosis: R shoulder impingement                                          Rehab [address], M25.611, R29.3, M62.81  Onset Date: 2017                                         Next 's appt: as needed  Total visits attended:   Cancels/No shows: 0/0  Date of initial visit: 17                Date of final visit: 10/30/17      Subjective:    Pain:  [x] Yes  [] No                    Location: R shoulder             Pain Rating: (0-10 scale) 1/10  Pain altered Tx:  [x] No  [] Yes  Action:  Comments1: Pt reports the R shoulder gets to about 3/10 throughout the day and believes it is more weakness than pain. Pt reports he has not tried any of his role playing activities yet. Pt reports moving arm out to the side is still challenging. Pt reports has not been able to complete his hobby but was able to throw a ball without an increase pain. Pt reports sitting at his desk isn't too bad.      Objective: 10/30/17  R shoulder AROM:   Flexion: 160 (pain at end range- 2/10) [previosuly 140 deg with pain]  Abduction: 135 deg (pain at the top is the most but throughout the full range, 3/10 at top) [previously 95 deg with pain]  IR: T11 (worst)- over [facility] [place] joint- L arm- mid thoracic  - Pain has improved with motions in all planes  R shoulder strength:  Flexion: 4+/5  Abduction: 4/5   IR: 4+/5  ER: 4+/5  Supraspinatus: 4+/5  Teres minor: 4+/5  B parascapular strength:  Rhomboids: 4+/5, R; 5/5, L  Middle trap: 4/5, R; 4+/5, L  Lower trap: 4/5, R; 4+/5, L     Special Tests: Drop arm neg- no presence of head tilt  3/10, max increase R shoulder pain     UEFS: 73/80, 71.25% max function    Assessment: Pt reports 3/10 R shoulder pain at the end of exercises which was addressed with a cold pack. Pt was able to progress/meet STG and LTG with an increase in AROM of the R shoulder along with increased R shoulder strength. Pt also demonstrated improved tolerance to PROM of the R shoulder in all planes with a continued decrease in IR. Pt able to complete PNF patterns with good tolerance once bands were adjusted and pt also able to complete quick punches to simulate hobby related tasks. Pt was provided with an updated HEP and was advised to complete all exercises at least 2x/week while completing a few individual exercises daily in order to maintain and progress strength and ROM. Pt will be discharged this date with instructions to continue HEP and to monitor R shoulder symptoms.                       Short term goals: MEET IN 6 VISITS Status   Pain: Pt will report less than or equal to 4/10 R shoulder pain with therapeutic ROM and strengthening exercises in order to improve tolerance to completion of ADLs. MET 10/9/17   ROM: Pt will demonstrate R shoulder AROM to greater than or equal to 150 degrees flexion and 110 deg abduction with reports of less than or equal to 4/10 pain in order to improve ability to complete overhead reaching and lifting tasks. MET 10/30/17   Strength: Pt will demonstrate 10 scapular retractions with symmetrical movement in order to demonstrate improved scapulohumeral mechanics and improve posture when completing ov MET 10/30/17    Home Exercise Program: Pt will demonstrate independence with HEP.  MET 10/9   Demonstrates knowledge of fall prevention Not needed Longer term goals: MEET IN 12 VISITS     Pain: Pt will report less than or equal to 1-2/10 R shoulder pain with overhead reaching, lifting, and ADLs along with recreational activities in order to progress to prior level of activity.   Intermittently met 10/30/17  (reaching overhead not too bad 2/10; Throwing- small spike in pain-  morning about 0 and with the throw about 2/10)   ROM: Pt will demonstrate R shoulder AROM to greater than or equal to 160 degrees flexion, 130 deg abduction, and IR to midline of lower thoracic spine with reports of less than or equal to 1-2/10 pain in order to improve ability to complete overhead reaching and lifting tasks. Partially met 10/30/17   Strength: Pt will demonstrate greater than or equal to 4+/5 R shoulder strength and 4/5 parascapular strength in order to improve stability and mechanics of the R shoulder when completing overhead and recreational activities. MET 10/30/17   Outcome Score: Pt will score greater than or equal to 92.5% max function on the UEFS in order to demonstrate improved function Not met 10/30/17  (73/80, 91.25% max function)          Patient goals: to get rid of the pain and learn how to maintain my hobby with little to no pain            Treatment to Date:  [x] Therapeutic Exercise    [] Modalities:  [] Therapeutic Activity    [] Ultrasound  [] Electrical Stimulation  [] Gait Training     [] Massage       [] Lumbar/Cervical Traction  [] Neuromuscular Re-education [x] Cold/hotpack [] Iontophoresis: 4 mg/mL  [x] Instruction in Home Exercise Program                     Dexamethasone Sodium  [] Manual Therapy             Phosphate 40-80 mAmin  [] Aquatic Therapy                   [x] Vasocompression/    [] Other:             Game Ready    Discharge Status:         [x] Pt to continue exercise/home instructions independently. [x] Pt has completed prescribed number of treatment sessions.            Electronically signed by Omer York PT on

## 2018-03-26 ENCOUNTER — OFFICE VISIT (OUTPATIENT)
Dept: ORTHOPEDIC SURGERY | Age: 43
End: 2018-03-26
Payer: COMMERCIAL

## 2018-03-26 VITALS — WEIGHT: 265 LBS | BODY MASS INDEX: 37.94 KG/M2 | HEIGHT: 70 IN

## 2018-03-26 DIAGNOSIS — M19.011 ARTHROSIS OF RIGHT ACROMIOCLAVICULAR JOINT: Primary | ICD-10-CM

## 2018-03-26 PROCEDURE — 20610 DRAIN/INJ JOINT/BURSA W/O US: CPT | Performed by: ORTHOPAEDIC SURGERY

## 2018-03-26 PROCEDURE — 99213 OFFICE O/P EST LOW 20 MIN: CPT | Performed by: ORTHOPAEDIC SURGERY

## 2018-03-26 RX ORDER — LIDOCAINE HYDROCHLORIDE 10 MG/ML
5 INJECTION, SOLUTION INFILTRATION; PERINEURAL ONCE
Status: COMPLETED | OUTPATIENT
Start: 2018-03-26 | End: 2018-03-26

## 2018-03-26 RX ORDER — TRIAMCINOLONE ACETONIDE 40 MG/ML
40 INJECTION, SUSPENSION INTRA-ARTICULAR; INTRAMUSCULAR ONCE
Status: COMPLETED | OUTPATIENT
Start: 2018-03-26 | End: 2018-03-26

## 2018-03-26 RX ADMIN — LIDOCAINE HYDROCHLORIDE 5 ML: 10 INJECTION, SOLUTION INFILTRATION; PERINEURAL at 09:04

## 2018-03-26 RX ADMIN — TRIAMCINOLONE ACETONIDE 40 MG: 40 INJECTION, SUSPENSION INTRA-ARTICULAR; INTRAMUSCULAR at 09:04

## 2018-03-26 NOTE — PROGRESS NOTES
Medical Records for details. Physical Exam:     Ht 5' 10\" (1.778 m)   Wt 265 lb (120.2 kg)   BMI 38.02 kg/m²    General Appearance: alert, well appearing, and in no distress  Mental Status: alert, oriented to person, place, and time  Gait: normal  Head and neck: Full range of motion through the cervical spine with no tenderness to palpation. Shoulder:    Skin: warm and dry, no rash or erythema   Vasculature: 2+ radial pulses bilaterally  Neuro: Sensation grossly intact to light touch diffusely  Tenderness: Tender to palpation over the acromioclavicular joint of the right shoulder    ROM: (Degrees)    Right   A P   Left   A P    Elevation  150 160   Elevation  155   Abduction  115 165   Abduction  160    ER   70 80   ER   80   IR   T12    IR   T10   90 abd/ER      90 abd/ER     90 abd/IR      90 abd/IR     Crepitation  No    Crepitation No      Muscle strength:    Right       Left    Deltoid   5    Deltoid   5  Supraspinatus  4+    Supraspinatus  4+  ER   5    ER   5  IR   5    IR   5    Special tests    Right   Rotator Cuff    Left    y   Painful arc    n   n   Pain with ER    n    y   Neer's     y    y   Hawkin's    y    n   Drop Arm    n  n   Lift off/Belly Press   n  n   ER Lag    n          [facility]  y   AC tenderness   n  n   Cross-chest adduction  n       Labrum/biceps    y (equivocal)  Macon's    n   n   Biceps sheer    n      n   Biceps groove tenderness  n    n   Speed's/Yergason's   n    n   Beny's    n         Instability  n   Sulcus     n    n   Ant Load shift    n    n   Post Load shift   n    n   Ant Apprehension   n   n   Post Apprehension   n  n   Relocation Test   n  n   Crank test    n  n   Generalized Laxity   n  n   Dioni-superior escape  n       Imaging:  Xrays: 3 views of the right shoulder obtained on 9/11/17 were independently reviewed  Indications: Right shoulder pain  Findings: Normal glenohumeral joint space. Mild acromioclavicular joint space narrowing.   Type I acromion. Impression: Mild right shoulder acromioclavicular joint osteoarthritis. Impression/Plan:     Nancy Tobin is a 36 y.o. old male with right shoulder acromioclavicular joint arthrosis. I had a discussion with the patient today with regards to the nature and extent of his problem and discussed treatment options available to them. At this time and recommend proceeding with a cortisone injection into the acromioclavicular joint which was serve both diagnostic and therapeutic purposes in addition to another round of physical therapy. He was amenable to this and had the injection administered as outlined below. Physical therapy was also set up. I'll have him follow-up in my clinic in 3 months for reevaluation but he was encouraged to return or call earlier with any questions and/or concerns. Procedure: right shoulder acromioclavicular joint injection  Following an appropriate discussion with the patient regarding the risks and benefits of the procedure he consented to proceed. his right shoulder was prepped using chlorhexadine solution. Using aseptic technique and through a superior approach, his right shoulder acromioclavicular joint space was injected with a 2 cc mixture of 1cc 40mg/ml kenalog and 1 cc of 1% lidocaine without epinephrine. A band aid was applied to the injection site. he tolerated the injection with no immediate adverse reactions.           NA = Not assessed  RTC = Rotator cuff  RCT = Rotator cuff tear  ER = External rotation  IR = Internal rotation  AC = Acromioclavicular  GH = Glenohumeral  n = No  y = Yes

## 2018-04-05 ENCOUNTER — HOSPITAL ENCOUNTER (OUTPATIENT)
Dept: PHYSICAL THERAPY | Age: 43
Setting detail: THERAPIES SERIES
Discharge: HOME | End: 2018-04-05
Payer: COMMERCIAL

## 2018-04-05 PROCEDURE — 97162 PT EVAL MOD COMPLEX 30 MIN: CPT

## 2018-04-05 PROCEDURE — 97110 THERAPEUTIC EXERCISES: CPT

## 2018-04-05 ASSESSMENT — PAIN DESCRIPTION - ONSET: ONSET: ON-GOING

## 2018-04-05 ASSESSMENT — PAIN DESCRIPTION - DESCRIPTORS: DESCRIPTORS: ACHING;CONSTANT;DULL

## 2018-04-05 ASSESSMENT — PAIN DESCRIPTION - LOCATION: LOCATION: SHOULDER

## 2018-04-05 ASSESSMENT — ACTIVITIES OF DAILY LIVING (ADL): EFFECT OF PAIN ON DAILY ACTIVITIES: 100% OF HIS ADL'S

## 2018-04-05 ASSESSMENT — PAIN DESCRIPTION - ORIENTATION: ORIENTATION: RIGHT;ANTERIOR

## 2018-04-05 ASSESSMENT — PAIN DESCRIPTION - PAIN TYPE: TYPE: CHRONIC PAIN

## 2018-04-05 ASSESSMENT — PAIN DESCRIPTION - PROGRESSION: CLINICAL_PROGRESSION: GRADUALLY WORSENING

## 2018-04-05 ASSESSMENT — PAIN SCALES - GENERAL: PAINLEVEL_OUTOF10: 6

## 2018-04-05 ASSESSMENT — PAIN DESCRIPTION - FREQUENCY: FREQUENCY: CONTINUOUS

## 2018-04-11 ENCOUNTER — HOSPITAL ENCOUNTER (OUTPATIENT)
Dept: PHYSICAL THERAPY | Age: 43
Setting detail: THERAPIES SERIES
Discharge: HOME | End: 2018-04-11
Payer: COMMERCIAL

## 2018-04-13 ENCOUNTER — HOSPITAL ENCOUNTER (OUTPATIENT)
Dept: PHYSICAL THERAPY | Age: 43
Setting detail: THERAPIES SERIES
Discharge: HOME | End: 2018-04-13
Payer: COMMERCIAL

## 2018-04-16 ENCOUNTER — HOSPITAL ENCOUNTER (OUTPATIENT)
Dept: PHYSICAL THERAPY | Age: 43
Setting detail: THERAPIES SERIES
Discharge: HOME | End: 2018-04-16
Payer: COMMERCIAL

## 2018-04-16 PROCEDURE — 97110 THERAPEUTIC EXERCISES: CPT

## 2018-04-16 PROCEDURE — G0283 ELEC STIM OTHER THAN WOUND: HCPCS

## 2018-04-16 ASSESSMENT — PAIN DESCRIPTION - ORIENTATION: ORIENTATION: RIGHT;ANTERIOR;PROXIMAL

## 2018-04-16 ASSESSMENT — PAIN SCALES - GENERAL: PAINLEVEL_OUTOF10: 4

## 2018-04-16 ASSESSMENT — PAIN DESCRIPTION - PAIN TYPE: TYPE: CHRONIC PAIN

## 2018-04-16 ASSESSMENT — PAIN DESCRIPTION - LOCATION: LOCATION: SHOULDER

## 2018-04-16 ASSESSMENT — PAIN DESCRIPTION - DESCRIPTORS: DESCRIPTORS: ACHING

## 2018-04-16 ASSESSMENT — PAIN DESCRIPTION - ONSET: ONSET: ON-GOING

## 2018-04-16 ASSESSMENT — PAIN DESCRIPTION - PROGRESSION: CLINICAL_PROGRESSION: GRADUALLY WORSENING

## 2018-04-18 ENCOUNTER — HOSPITAL ENCOUNTER (OUTPATIENT)
Dept: PHYSICAL THERAPY | Age: 43
Setting detail: THERAPIES SERIES
Discharge: HOME | End: 2018-04-18
Payer: COMMERCIAL

## 2018-04-18 PROCEDURE — G0283 ELEC STIM OTHER THAN WOUND: HCPCS

## 2018-04-18 PROCEDURE — 97110 THERAPEUTIC EXERCISES: CPT

## 2018-04-18 ASSESSMENT — PAIN SCALES - GENERAL: PAINLEVEL_OUTOF10: 5

## 2018-04-18 ASSESSMENT — PAIN DESCRIPTION - PAIN TYPE: TYPE: CHRONIC PAIN

## 2018-04-18 ASSESSMENT — PAIN DESCRIPTION - LOCATION: LOCATION: SHOULDER

## 2018-04-18 ASSESSMENT — PAIN DESCRIPTION - ORIENTATION: ORIENTATION: RIGHT;ANTERIOR;UPPER

## 2018-04-18 ASSESSMENT — PAIN DESCRIPTION - DESCRIPTORS: DESCRIPTORS: ACHING;CONSTANT

## 2018-04-23 ENCOUNTER — HOSPITAL ENCOUNTER (OUTPATIENT)
Dept: PHYSICAL THERAPY | Age: 43
Setting detail: THERAPIES SERIES
Discharge: HOME | End: 2018-04-23
Payer: COMMERCIAL

## 2018-04-23 PROCEDURE — 97110 THERAPEUTIC EXERCISES: CPT

## 2018-04-23 PROCEDURE — G0283 ELEC STIM OTHER THAN WOUND: HCPCS

## 2018-04-23 ASSESSMENT — PAIN DESCRIPTION - ORIENTATION: ORIENTATION: RIGHT;ANTERIOR;UPPER

## 2018-04-23 ASSESSMENT — PAIN DESCRIPTION - LOCATION: LOCATION: SHOULDER

## 2018-04-23 ASSESSMENT — PAIN DESCRIPTION - PAIN TYPE: TYPE: CHRONIC PAIN

## 2018-04-23 ASSESSMENT — PAIN SCALES - GENERAL: PAINLEVEL_OUTOF10: 4

## 2018-04-23 ASSESSMENT — PAIN DESCRIPTION - DESCRIPTORS: DESCRIPTORS: ACHING;CONSTANT

## 2018-04-25 ENCOUNTER — HOSPITAL ENCOUNTER (OUTPATIENT)
Dept: PHYSICAL THERAPY | Age: 43
Setting detail: THERAPIES SERIES
Discharge: HOME | End: 2018-04-25
Payer: COMMERCIAL

## 2018-04-25 PROCEDURE — 97110 THERAPEUTIC EXERCISES: CPT

## 2018-04-25 PROCEDURE — G0283 ELEC STIM OTHER THAN WOUND: HCPCS

## 2018-04-25 ASSESSMENT — PAIN DESCRIPTION - DESCRIPTORS: DESCRIPTORS: ACHING;CONSTANT

## 2018-04-25 ASSESSMENT — PAIN SCALES - GENERAL: PAINLEVEL_OUTOF10: 3

## 2018-04-25 ASSESSMENT — PAIN DESCRIPTION - LOCATION: LOCATION: SHOULDER

## 2018-04-25 ASSESSMENT — PAIN DESCRIPTION - PAIN TYPE: TYPE: CHRONIC PAIN

## 2018-04-25 ASSESSMENT — PAIN DESCRIPTION - ORIENTATION: ORIENTATION: RIGHT;ANTERIOR;UPPER

## 2018-04-30 ENCOUNTER — HOSPITAL ENCOUNTER (OUTPATIENT)
Dept: PHYSICAL THERAPY | Age: 43
Setting detail: THERAPIES SERIES
Discharge: HOME | End: 2018-04-30
Payer: COMMERCIAL

## 2018-04-30 PROCEDURE — 97110 THERAPEUTIC EXERCISES: CPT

## 2018-04-30 PROCEDURE — G0283 ELEC STIM OTHER THAN WOUND: HCPCS

## 2018-04-30 ASSESSMENT — PAIN DESCRIPTION - PAIN TYPE: TYPE: CHRONIC PAIN

## 2018-04-30 ASSESSMENT — PAIN SCALES - GENERAL: PAINLEVEL_OUTOF10: 3

## 2018-04-30 ASSESSMENT — PAIN DESCRIPTION - ORIENTATION: ORIENTATION: RIGHT;ANTERIOR

## 2018-04-30 ASSESSMENT — PAIN DESCRIPTION - FREQUENCY: FREQUENCY: CONTINUOUS

## 2018-04-30 ASSESSMENT — PAIN DESCRIPTION - DESCRIPTORS: DESCRIPTORS: ACHING;CONSTANT

## 2018-04-30 ASSESSMENT — PAIN DESCRIPTION - LOCATION: LOCATION: SHOULDER

## 2018-05-02 ENCOUNTER — HOSPITAL ENCOUNTER (OUTPATIENT)
Dept: PHYSICAL THERAPY | Age: 43
Setting detail: THERAPIES SERIES
Discharge: HOME | End: 2018-05-02
Payer: COMMERCIAL

## 2018-05-07 ENCOUNTER — HOSPITAL ENCOUNTER (OUTPATIENT)
Dept: PHYSICAL THERAPY | Age: 43
Setting detail: THERAPIES SERIES
Discharge: HOME | End: 2018-05-07
Payer: COMMERCIAL

## 2018-05-07 PROCEDURE — 97110 THERAPEUTIC EXERCISES: CPT

## 2018-05-07 PROCEDURE — G0283 ELEC STIM OTHER THAN WOUND: HCPCS

## 2018-05-07 ASSESSMENT — PAIN SCALES - GENERAL: PAINLEVEL_OUTOF10: 3

## 2018-05-07 ASSESSMENT — PAIN DESCRIPTION - LOCATION: LOCATION: SHOULDER

## 2018-05-07 ASSESSMENT — PAIN DESCRIPTION - FREQUENCY: FREQUENCY: CONTINUOUS

## 2018-05-07 ASSESSMENT — PAIN DESCRIPTION - ORIENTATION: ORIENTATION: RIGHT;ANTERIOR

## 2018-05-07 ASSESSMENT — PAIN DESCRIPTION - DESCRIPTORS: DESCRIPTORS: ACHING;CONSTANT

## 2018-05-07 ASSESSMENT — PAIN DESCRIPTION - PAIN TYPE: TYPE: CHRONIC PAIN

## 2018-05-09 ENCOUNTER — HOSPITAL ENCOUNTER (OUTPATIENT)
Dept: PHYSICAL THERAPY | Age: 43
Setting detail: THERAPIES SERIES
Discharge: HOME | End: 2018-05-09
Payer: COMMERCIAL

## 2018-05-09 PROCEDURE — 97110 THERAPEUTIC EXERCISES: CPT

## 2018-05-09 PROCEDURE — G0283 ELEC STIM OTHER THAN WOUND: HCPCS

## 2018-05-09 ASSESSMENT — PAIN DESCRIPTION - FREQUENCY: FREQUENCY: CONTINUOUS

## 2018-05-09 ASSESSMENT — PAIN DESCRIPTION - DESCRIPTORS: DESCRIPTORS: ACHING;CONSTANT

## 2018-05-09 ASSESSMENT — PAIN DESCRIPTION - LOCATION: LOCATION: SHOULDER

## 2018-05-09 ASSESSMENT — PAIN SCALES - GENERAL: PAINLEVEL_OUTOF10: 3

## 2018-05-09 ASSESSMENT — PAIN DESCRIPTION - PAIN TYPE: TYPE: CHRONIC PAIN

## 2018-05-09 ASSESSMENT — PAIN DESCRIPTION - ORIENTATION: ORIENTATION: RIGHT;ANTERIOR

## 2018-06-26 ENCOUNTER — OFFICE VISIT (OUTPATIENT)
Dept: ORTHOPEDIC SURGERY | Age: 43
End: 2018-06-26
Payer: COMMERCIAL

## 2018-06-26 VITALS — HEIGHT: 70 IN | WEIGHT: 275 LBS | BODY MASS INDEX: 39.37 KG/M2

## 2018-06-26 DIAGNOSIS — M19.011 ARTHROSIS OF RIGHT ACROMIOCLAVICULAR JOINT: Primary | ICD-10-CM

## 2018-06-26 PROCEDURE — 99212 OFFICE O/P EST SF 10 MIN: CPT | Performed by: ORTHOPAEDIC SURGERY

## 2018-06-26 RX ORDER — DICLOFENAC SODIUM 75 MG/1
75 TABLET, DELAYED RELEASE ORAL 2 TIMES DAILY WITH MEALS
Qty: 28 TABLET | Refills: 1 | Status: ON HOLD | OUTPATIENT
Start: 2018-06-26 | End: 2021-12-03 | Stop reason: ALTCHOICE

## 2018-10-07 ENCOUNTER — PATIENT MESSAGE (OUTPATIENT)
Dept: FAMILY MEDICINE CLINIC | Age: 43
End: 2018-10-07

## 2018-10-07 DIAGNOSIS — M79.673 HEEL PAIN, UNSPECIFIED LATERALITY: Primary | ICD-10-CM

## 2019-03-19 DIAGNOSIS — M19.011 ARTHROSIS OF RIGHT ACROMIOCLAVICULAR JOINT: Primary | ICD-10-CM

## 2019-03-22 ENCOUNTER — OFFICE VISIT (OUTPATIENT)
Dept: ORTHOPEDIC SURGERY | Age: 44
End: 2019-03-22
Payer: COMMERCIAL

## 2019-03-22 VITALS — HEIGHT: 70 IN | BODY MASS INDEX: 41.23 KG/M2 | WEIGHT: 288 LBS

## 2019-03-22 DIAGNOSIS — M75.41 SHOULDER IMPINGEMENT, RIGHT: ICD-10-CM

## 2019-03-22 DIAGNOSIS — M19.011 ARTHROSIS OF RIGHT ACROMIOCLAVICULAR JOINT: Primary | ICD-10-CM

## 2019-03-22 PROCEDURE — 20611 DRAIN/INJ JOINT/BURSA W/US: CPT | Performed by: ORTHOPAEDIC SURGERY

## 2019-03-22 PROCEDURE — 20610 DRAIN/INJ JOINT/BURSA W/O US: CPT | Performed by: ORTHOPAEDIC SURGERY

## 2019-03-22 PROCEDURE — 99213 OFFICE O/P EST LOW 20 MIN: CPT | Performed by: ORTHOPAEDIC SURGERY

## 2019-03-22 RX ORDER — METHYLPREDNISOLONE ACETATE 80 MG/ML
80 INJECTION, SUSPENSION INTRA-ARTICULAR; INTRALESIONAL; INTRAMUSCULAR; SOFT TISSUE ONCE
Status: COMPLETED | OUTPATIENT
Start: 2019-03-22 | End: 2019-03-22

## 2019-03-22 RX ORDER — BUPIVACAINE HYDROCHLORIDE 2.5 MG/ML
2 INJECTION, SOLUTION INFILTRATION; PERINEURAL ONCE
Status: COMPLETED | OUTPATIENT
Start: 2019-03-22 | End: 2019-03-22

## 2019-03-22 RX ADMIN — BUPIVACAINE HYDROCHLORIDE 5 MG: 2.5 INJECTION, SOLUTION INFILTRATION; PERINEURAL at 15:40

## 2019-03-22 RX ADMIN — METHYLPREDNISOLONE ACETATE 80 MG: 80 INJECTION, SUSPENSION INTRA-ARTICULAR; INTRALESIONAL; INTRAMUSCULAR; SOFT TISSUE at 15:40

## 2019-03-22 RX ADMIN — BUPIVACAINE HYDROCHLORIDE 5 MG: 2.5 INJECTION, SOLUTION INFILTRATION; PERINEURAL at 15:39

## 2019-03-22 ASSESSMENT — ENCOUNTER SYMPTOMS
COUGH: 0
NAUSEA: 0
DIARRHEA: 0
CONSTIPATION: 0

## 2019-10-18 ENCOUNTER — TELEPHONE (OUTPATIENT)
Dept: ORTHOPEDIC SURGERY | Age: 44
End: 2019-10-18

## 2019-10-29 ENCOUNTER — OFFICE VISIT (OUTPATIENT)
Dept: ORTHOPEDIC SURGERY | Age: 44
End: 2019-10-29
Payer: COMMERCIAL

## 2019-10-29 DIAGNOSIS — M79.645 FINGER PAIN, LEFT: Primary | ICD-10-CM

## 2019-10-29 PROCEDURE — 99203 OFFICE O/P NEW LOW 30 MIN: CPT | Performed by: ORTHOPAEDIC SURGERY

## 2019-12-12 ENCOUNTER — OFFICE VISIT (OUTPATIENT)
Dept: ORTHOPEDIC SURGERY | Age: 44
End: 2019-12-12
Payer: COMMERCIAL

## 2019-12-12 DIAGNOSIS — S56.429S EXTENSOR TENDON LACERATION, FINGER, OPEN WOUND, SEQUELA: Primary | ICD-10-CM

## 2019-12-12 DIAGNOSIS — S61.209S EXTENSOR TENDON LACERATION, FINGER, OPEN WOUND, SEQUELA: Primary | ICD-10-CM

## 2019-12-12 PROCEDURE — 99213 OFFICE O/P EST LOW 20 MIN: CPT | Performed by: ORTHOPAEDIC SURGERY

## 2020-01-02 ENCOUNTER — OFFICE VISIT (OUTPATIENT)
Dept: FAMILY MEDICINE CLINIC | Age: 45
End: 2020-01-02
Payer: COMMERCIAL

## 2020-01-02 VITALS
BODY MASS INDEX: 43.95 KG/M2 | TEMPERATURE: 101.8 F | WEIGHT: 307 LBS | SYSTOLIC BLOOD PRESSURE: 110 MMHG | DIASTOLIC BLOOD PRESSURE: 70 MMHG | HEIGHT: 70 IN | OXYGEN SATURATION: 96 % | HEART RATE: 110 BPM

## 2020-01-02 LAB
INFLUENZA A ANTIBODY: ABNORMAL
INFLUENZA B ANTIBODY: ABNORMAL
S PYO AG THROAT QL: NORMAL

## 2020-01-02 PROCEDURE — 87804 INFLUENZA ASSAY W/OPTIC: CPT | Performed by: NURSE PRACTITIONER

## 2020-01-02 PROCEDURE — 87880 STREP A ASSAY W/OPTIC: CPT | Performed by: NURSE PRACTITIONER

## 2020-01-02 PROCEDURE — 99213 OFFICE O/P EST LOW 20 MIN: CPT | Performed by: NURSE PRACTITIONER

## 2020-01-02 RX ORDER — OSELTAMIVIR PHOSPHATE 75 MG/1
75 CAPSULE ORAL 2 TIMES DAILY
Qty: 10 CAPSULE | Refills: 0 | Status: SHIPPED | OUTPATIENT
Start: 2020-01-02 | End: 2020-01-07

## 2020-01-02 ASSESSMENT — ENCOUNTER SYMPTOMS
GASTROINTESTINAL NEGATIVE: 1
SORE THROAT: 1
SHORTNESS OF BREATH: 0
COUGH: 1
WHEEZING: 0

## 2020-01-02 NOTE — PROGRESS NOTES
[facility]  [facility] [address]  [facility] [address]  Dept: [phone]  Dept Fax: [phone]      Peter Abbasi is a 38 y.o. male who presents today for hismedical conditions/complaints as noted below. Peter Abbasi is c/o of Cough; Fever; and Pharyngitis            HPI:      HPI  Nghia Tavarez is here today flu like symptoms. Body aches, fevers, cough, sore throat, lightheadedness. Fatigue. Wife is ill as well, influenza +. Using tylenol cold and flu. Symptoms started 3 days ago. No results found for: LABA1C          ( goal A1Cis < 7)   No results found for: LABMICR  LDL Cholesterol (mg/dL)   Date Value   09/05/2017 100       (goal LDL is <100)   BUN (mg/dL)   Date Value   09/05/2017 8     BP Readings from Last 3 Encounters:   01/02/20 110/70   08/24/17 122/68          (goal 120/80)    No past medical history on file. Past Surgical History:   Procedure Laterality Date    ANKLE SURGERY      left    GASTRIC BYPASS SURGERY  2014    nellie en y       Family History   Problem Relation Age of Onset    Diabetes Father     Other Father           Social History     Tobacco Use    Smoking status: Never Smoker    Smokeless tobacco: Never Used   Substance Use Topics    Alcohol use: No         Current Outpatient Medications   Medication Sig Dispense Refill    oseltamivir (TAMIFLU) 75 MG capsule Take 1 capsule by mouth 2 times daily for 5 days 10 capsule 0    diclofenac (VOLTAREN) 75 MG EC tablet Take 1 tablet by mouth 2 times daily (with meals) for 14 days 28 tablet 1     No current facility-administered medications for this visit. No Known Allergies    Health Maintenance   Topic Date Due    HIV screen  12/24/1989    Flu vaccine (1) 09/01/2019    Lipid screen  09/05/2022    DTaP/Tdap/Td vaccine (3 - Td) 09/28/2029    Pneumococcal 0-64 years Vaccine  Aged Out          Review of Systems   Constitutional: Positive for chills and fever. Negative for diaphoresis and fatigue. HENT: Positive for congestion and sore throat. Respiratory: Positive for cough. Negative for shortness of breath and wheezing. Cardiovascular: Negative. Negative for chest pain and palpitations. Gastrointestinal: Negative. Musculoskeletal: Positive for myalgias. Negative for arthralgias. Skin: Negative. Negative for pallor and rash. Neurological: Negative for syncope and headaches. Hematological: Negative. Psychiatric/Behavioral: Negative. Negative for sleep disturbance. The patient is not nervous/anxious. Objective:     /70 (Site: Left Upper Arm, Position: Sitting, Cuff Size: Medium Adult)   Pulse 110   Temp 101.8 °F (38.8 °C)   Ht 5' 10\" (1.778 m)   Wt (!) 307 lb (139.3 kg)   SpO2 96%   BMI 44.05 kg/m²   Physical Exam  Vitals signs reviewed. Constitutional:       Appearance: He is well-developed. He is obese. He is ill-appearing. HENT:      Head: Normocephalic and atraumatic. Mouth/Throat:      Mouth: Mucous membranes are dry. Pharynx: Pharyngeal swelling and posterior oropharyngeal erythema present. Eyes:      General:         Right eye: No discharge. Left eye: No discharge. Conjunctiva/sclera: Conjunctivae normal.   Neck:      Musculoskeletal: Normal range of motion. Cardiovascular:      Rate and Rhythm: Regular rhythm. Tachycardia present. Heart sounds: Normal heart sounds. No murmur. Pulmonary:      Effort: Pulmonary effort is normal. No respiratory distress. Breath sounds: Normal breath sounds. No wheezing or rales. Musculoskeletal: Normal range of motion. Skin:     General: Skin is warm and dry. Findings: No erythema or rash. Neurological:      Mental Status: He is alert and oriented to person, place, and time. Psychiatric:         Behavior: Behavior normal.         Thought Content: Thought content normal.         Judgment: Judgment normal.           Assessment:      1. Body aches    2.  Fever, unspecified

## 2020-01-02 NOTE — PROGRESS NOTES
Pt comes in for cough,fever, sore throat, body aches for 2 days. Temp this morning was 102 per patient. Also complains of SOB. Taking OTC meds.

## 2021-01-26 ENCOUNTER — OFFICE VISIT (OUTPATIENT)
Dept: PODIATRY | Age: 46
End: 2021-01-26
Payer: COMMERCIAL

## 2021-01-26 VITALS — HEIGHT: 67 IN | WEIGHT: 290 LBS | RESPIRATION RATE: 18 BRPM | BODY MASS INDEX: 45.52 KG/M2 | TEMPERATURE: 97.1 F

## 2021-01-26 DIAGNOSIS — M72.2 PLANTAR FASCIAL FIBROMATOSIS: ICD-10-CM

## 2021-01-26 DIAGNOSIS — M79.671 BILATERAL FOOT PAIN: ICD-10-CM

## 2021-01-26 DIAGNOSIS — M79.672 BILATERAL FOOT PAIN: ICD-10-CM

## 2021-01-26 DIAGNOSIS — R26.2 TROUBLE WALKING: Primary | ICD-10-CM

## 2021-01-26 PROCEDURE — 99203 OFFICE O/P NEW LOW 30 MIN: CPT | Performed by: PODIATRIST

## 2021-01-26 RX ORDER — PREDNISONE 10 MG/1
TABLET ORAL
Qty: 20 TABLET | Refills: 0 | Status: ON HOLD | OUTPATIENT
Start: 2021-01-26 | End: 2021-12-03 | Stop reason: ALTCHOICE

## 2021-01-26 NOTE — PROGRESS NOTES
[facility] PHYSICIANS  [facility]  [address] [address] [place] [address]  Dept: [phone]  Dept Fax: [phone]    NEW PATIENT PROGRESS NOTE  Date of patient's visit: 1/26/2021  Patient's Name:  Junito Nieves YOB: 1975            Patient Care Team:  MICHELINE Gilbert CNP as PCP - General (Certified Nurse Practitioner)  MICHELINE Gilbert CNP as PCP - [facility] Empaneled Provider  Cammy Lopez DPM as Physician (Podiatry)        Chief Complaint   Patient presents with    New Patient    Foot Pain         HPI:   Junito Nieves is a 54 y.o. male who presents to the office today complaining of bilateral foot pain and bumps. Symptoms began 5 year(s) ago. Patient relates pain is Present. Pain is rated 2 out of 10 and is described as constant, moderate. Treatments prior to today's visit include: none. Pt states it hurts during the first few steps of the day. Currently denies F/C/N/V. Pt's primary care physician is MICHELINE Still CNP last seen 01/02/2020     Allergies   Allergen Reactions    No Known Allergies        No past medical history on file. Prior to Admission medications    Medication Sig Start Date End Date Taking?  Authorizing Provider   diclofenac (VOLTAREN) 75 MG EC tablet Take 1 tablet by mouth 2 times daily (with meals) for 14 days 6/26/18 7/10/18  Rayburn Severin, MD       Past Surgical History:   Procedure Laterality Date    ANKLE SURGERY      left    GASTRIC BYPASS SURGERY  2014    nellie en y       Family History   Problem Relation Age of Onset    Diabetes Father     Other Father        Social History     Tobacco Use    Smoking status: Never Smoker    Smokeless tobacco: Never Used   Substance Use Topics    Alcohol use: No       Review of Systems    Review of Systems:   History obtained from chart review and the patient  General ROS: negative for - chills, fatigue, fever, night sweats or weight gain Constitutional: Negative for chills, diaphoresis, fatigue, fever and unexpected weight change. Musculoskeletal: Positive for arthralgias, gait problem and joint swelling. Neurological ROS: negative for - behavioral changes, confusion, headaches or seizures. Negative for weakness and numbness. Dermatological ROS: negative for - mole changes, rash  Cardiovascular: Negative for leg swelling. Gastrointestinal: Negative for constipation, diarrhea, nausea and vomiting. Lower Extremity Physical Examination:   Vitals:   Vitals:    01/26/21 1455   Resp: 18   Temp: 97.1 °F (36.2 °C)     General: AAO x 3 in NAD. Dermatologic Exam:  Skin lesion/ulceration Absent . Skin No rashes or nodules noted. .       Musculoskeletal:     1st MPJ ROM decreased, Bilateral.  Muscle strength 5/5, Bilateral. POP of the right and left plantar medial calcaneal tuberosity. Pain increased with Dorsiflexion of the right and left lesser toes. Negative pain on compression of the calcaneus bilaterally Medial longitudinal arch, Bilateral WNL.   Ankle ROM WNL,Bilateral.    Dorsally contracted digits absent digits 1-5 Bilateral.     Vascular: DP and PT pulses palpable 2/4, Bilateral.  CFT <3 seconds, Bilateral.  Hair growth present to the level of the digits, Bilateral.  Edema absent, Bilateral.  Varicosities absent, Bilateral. Erythema absent, Bilateral    Neurological: Sensation intact to light touch to level of digits, Bilateral.  Protective sensation intact 10/10 sites via 5.07/10g [place]-Akira Monofilament, Bilateral.  negative Tinel's, Bilateral.  negative Valleix sign, Bilateral.      Integument: Warm, dry, supple, Bilateral.  Open lesion absent, Bilateral.  Interdigital maceration absent to web spaces 1-4, Bilateral.  Nails are normal in length, thickness and color 1-5 bilateral.  Fissures absent, Bilateral.         Asessment: Patient is a 54 y.o. male with:    Diagnosis Orders 1. Trouble walking  predniSONE (DELTASONE) 10 MG tablet   2. Bilateral foot pain  predniSONE (DELTASONE) 10 MG tablet   3. Plantar fascial fibromatosis  predniSONE (DELTASONE) 10 MG tablet         Plan: Patient examined and evaluated. Current condition and treatment options discussed in detail. Discussed conservative and surgical options with the patient. Advised pt to his condition. Arch Pain: Exercises  Introduction  Here are some examples of exercises for you to try. The exercises may be suggested for a condition or for rehabilitation. Start each exercise slowly. Ease off the exercises if you start to have pain. You will be told when to start these exercises and which ones will work best for you. How to do the exercises  Plantar fascia stretch    1. Sit in a chair and put your affected foot on your other knee. 2. Hold the heel of your foot in one hand, and grasp your toes with the other hand. 3. Pull on your heel (toward your body), and at the same time pull your toes back with your other hand. 4. You should feel a stretch along the bottom of your foot. 5. Hold 15 to 30 seconds. 6. Repeat 2 to 4 times. Plantar fascia stretch (kneeling)    1. Get on your hands and knees on the floor. Keep your heels pointing up and the balls of your feet and your toes on the floor. 2. Slowly sit back toward your ankles. 3. If this is too hard, you can try doing it one leg at a time. Stand up, and then kneel on one knee and keep the other leg forward. Place the foot of your forward leg flat on the ground and bend that knee. The heel on the leg still behind you should point up. The ball and toes of that foot should be on the floor. Sit back toward that ankle. 4. Hold 15 to 30 seconds. 5. Repeat 2 to 4 times. Switch legs if you are doing this one leg at a time. Plantar fascia self-massage    1. Sit in a chair. 2. Place your affected foot on a firm, tube-shaped object, such as a can or water bottle. 3. Roll your foot back and forth over the object to massage the bottom of your foot. 4. If you want to do ice massage, fill a water bottle about three-fourths of the way full and freeze before using. 5. Continue for 2 to 5 minutes. Bilateral calf stretch (knees straight)    1. Place a book on the floor a few inches from a wall or countertop, and put the balls of your feet on it. Your heels should be on the floor. The book needs to be thick enough so that you can feel a gentle stretch in each calf. If you are not steady on your feet, hold on to a chair, counter, or wall while you do this stretch. 2. Keep your knees straight, and lean forward until you feel a stretch in each calf. 3. To get more stretch, add another book or use a thicker book, such as a phone book, a dictionary, or an encyclopedia. 4. Hold the stretch for at least 15 to 30 seconds. 5. Repeat 2 to 4 times. Bilateral calf stretch (knees bent)    1. Place a book on the floor a few inches from a wall or countertop, and put the balls of your feet on it. Your heels should be on the floor. The book needs to be thick enough so that you can feel a gentle stretch in each calf. If you are not steady on your feet, hold on to a chair, counter, or wall while you do this stretch. 2. Bend your knees, and lean forward until you feel a stretch in each calf. 3. To get more stretch, add another book or use a thicker book, such as a phone book, a dictionary, or an encyclopedia. 4. Hold the stretch for at least 15 to 30 seconds. 5. Repeat 2 to 4 times. [place] pick-ups    1. Put some marbles on the floor next to a cup.  2. Sit down, and use the toes of your affected foot to lift up one marble from the floor at a time. Then try to put the marble in the cup.  3. Repeat 8 to 12 times. Towel scrunches    1. Sit down, and place your affected foot on a towel on the floor. You may also do this with both feet on the towel. 2. Scrunch the towel toward you with your toes. Then use your toes to push the towel back into place. 3. Repeat 8 to 12 times. Heel raises on a step    1. Stand on the bottom step of a staircase, facing up toward the stairs. Put the balls of your feet on the step. If you are not steady on your feet, hold on to the banister or wall. 2. Keeping both knees straight, slowly lift your heels above the step so that you are standing on your toes. Then slowly lower your heels below the step and toward the floor. 3. Return to the starting position, with your feet even with the step. 4. Repeat 8 to 12 times. Follow-up care is a key part of your treatment and safety. Be sure to make and go to all appointments, and call your doctor if you are having problems. It's also a good idea to know your test results and keep a list of the medicines you take. Where can you learn more? Go to https://FibroGen.Zoomph. org and sign in to your Evergram account. Enter H119 in the [place] box to learn more about \"Arch Pain: Exercises. \"     If you do not have an account, please click on the \"Sign Up Now\" link. Current as of: September 20, 2018  Content Version: 12.1  © 9254-8462 Healthwise, Incorporated. Care instructions adapted under license by [facility] ([facility]). If you have questions about a medical condition or this instruction, always ask your healthcare professional. [place] [address] any warranty or liability for your use of this information. Heel Spurs/Plantar Fasciitis      1. Taping- keep on for 5-7 days if possible and DO NOT get it wet. If you see redness or feel itching, then take off the taping: this may be an indication that you are allergic to the tap. 2. Injection- contains a small amount of cortisone to reduce your inflammation. It is possible that after 24 hours you may feel a small increase in pain at the injection site. This is \"Steroid Flare\" and will subside after 24 hours. 3. Anti-inflammatory medications- Take as prescribed      4. Ice massage- There are a number of ways to ice your foot/feet. The best way is to place a full water bottle in the freezer and then roll the bottom of your foot on it up to 3 times a day. 5. Heel cord stretching- stretch at least twice a day. See below    Wall Stretch        Affected Heel flat on floor and keep that knee straight  Bend the other knee and lean into the wall  Hold that stretched position for 20-30 seconds  Repeat 5 times    DO NOT: Bounce or jerk back and forth while doing the stretch  DO Not: Point toes out to the side                  Towel Stretch      Place towel under ball of foot  Pull foot towards you and hold for 5 seconds  Then push foot down and hold for 5 seconds  Repeat this 5-10 times      6. Wear good supportive shoes with adequate shock absorption. 7. Orthotics: We recommend custom molded orthotics and we can call your insurance to see if this is a covered benefit for you. However, if they are not covered, we carry over-the- counter orthotics called Powersteps. They cost $43.00 and can be purchased with cash, check or charge at the . 8. Physical Therapy- will be prescribed as needed. All labs were reviewed and all imagining including the above findings were reviewed prior to the patients arrival and with the patient today. Previous patient encounter was reviewed. Encounters from the patients other medical providers were reviewed and noted. Time was spent educating the patient and their families/caregivers on proper care of the feet and ankles. All the above diagnosis were addressed at todays visit and all questions were answered. A total of 30 minutes was spent with this patients encounter  . Verbal and written instructions given to patient. Contact office with any questions/problems/concerns.   RTC in 2 weeks      rx provided for tapered steroid to be taken as directed   1/26/2021    Electronically signed by Murtaza Kraft DPM on 1/26/2021 at 3:23 PM  1/26/2021

## 2021-09-23 ENCOUNTER — OFFICE VISIT (OUTPATIENT)
Dept: PODIATRY | Age: 46
End: 2021-09-23
Payer: COMMERCIAL

## 2021-09-23 VITALS — WEIGHT: 290 LBS | BODY MASS INDEX: 45.52 KG/M2 | HEIGHT: 67 IN

## 2021-09-23 DIAGNOSIS — M79.672 LEFT FOOT PAIN: ICD-10-CM

## 2021-09-23 DIAGNOSIS — M25.572 ACUTE LEFT ANKLE PAIN: ICD-10-CM

## 2021-09-23 DIAGNOSIS — S86.002A INJURY OF LEFT ACHILLES TENDON, INITIAL ENCOUNTER: Primary | ICD-10-CM

## 2021-09-23 PROCEDURE — L4361 PNEUMA/VAC WALK BOOT PRE OTS: HCPCS | Performed by: PODIATRIST

## 2021-09-23 PROCEDURE — 99214 OFFICE O/P EST MOD 30 MIN: CPT | Performed by: PODIATRIST

## 2021-09-23 NOTE — PATIENT INSTRUCTIONS
Schedule a Vaccine  When you qualify to receive the vaccine, call the [facility]) COVID-19 Vaccination Hotline to schedule your appointment or to get additional information about the [facility]) locations which are offering the COVID-19 vaccine. To be 94% effective, it's important that you receive two doses of one of the COVID-19 vaccines. -If you are receiving the Rosa Peter vaccine, your second shot will be scheduled as close to 21 days after the first shot as possible. -If you are receiving the Moderna vaccine, your second shot will be scheduled as close to 28 days after the first shot as possible. [facility]) COVID-19 Vaccination Hotline: [phone]    Links to [facility]) website and [facility] website:    [email or web address]    [email or web address]

## 2021-09-23 NOTE — PROGRESS NOTES
[facility] PHYSICIANS  [facility]  [address] [place] [address]  Dept: [phone]  Dept Fax: [phone]    RETURN PATIENT PROGRESS NOTE  Date of patient's visit: 9/23/2021  Patient's Name:  Constantin Culver YOB: 1975            Patient Care Team:  MICHELINE Young CNP as PCP - General (Certified Nurse Practitioner)  MICHELINE Young CNP as PCP - [address] [place] [address] [facility]  Gayathri Talavera DPM as Physician (Podiatry)       Constantin Culver 54 y.o. male that presents for follow-up of   Chief Complaint   Patient presents with    Foot Pain     Left foot     Pt's primary care physician is MICHELINE Davis CNP last seen 8/2/2021  Symptoms began 3 week(s) ago and are increased . Patient relates pain is Present. Pain is rated 5 out of 10 and is described as moderate. Treatments prior to today's visit include: previous heel surgery to the left foot about 15 years ago. Currently denies F/C/N/V. Allergies   Allergen Reactions    No Known Allergies        History reviewed. No pertinent past medical history. Prior to Admission medications    Medication Sig Start Date End Date Taking? Authorizing Provider   predniSONE (DELTASONE) 10 MG tablet Take one PO TID x 3 days  Take one PO BID x 3 days  Take one PO q daily x 3 days  Take 1/2 po q daily x 4 days 1/26/21  Yes Gayathri Talavera DPM   diclofenac (VOLTAREN) 75 MG EC tablet Take 1 tablet by mouth 2 times daily (with meals) for 14 days 6/26/18 7/10/18  Yareli Mattson MD       Review of Systems    Review of Systems:  History obtained from chart review and the patient  General ROS: negative for - chills, fatigue, fever, night sweats or weight gain  Constitutional: Negative for chills, diaphoresis, fatigue, fever and unexpected weight change. Musculoskeletal: Positive for arthralgias, gait problem and joint swelling. Neurological ROS: negative for - behavioral changes, confusion, headaches or seizures. Negative for weakness and numbness. Dermatological ROS: negative for - mole changes, rash  Cardiovascular: Negative for leg swelling. Gastrointestinal: Negative for constipation, diarrhea, nausea and vomiting. Lower Extremity Physical Examination:     Vitals: There were no vitals filed for this visit. General: AAO x 3 in NAD. Dermatologic Exam:  Skin lesion/ulceration Absent . Skin No rashes or nodules noted. .       Musculoskeletal:     1st MPJ ROM decreased, Bilateral.  Muscle strength 5/5, Bilateral.  Pain present upon palpation of left achilles tendon at the posterior aspect of the left calcaneus at the insertion and 3 cm proximal to the insertion   Pain with end ROM of dorsiflexion at the posterior heel . Medial longitudinal arch, Bilateral WNL. Ankle ROM WNL,Bilateral.    Dorsally contracted digits absent digits 1-5 Bilateral.     Vascular: DP and PT pulses palpable 2/4, Bilateral.  CFT <3 seconds, Bilateral.  Hair growth present to the level of the digits, Bilateral.  Edema absent, Bilateral.  Varicosities absent, Bilateral. Erythema absent, Bilateral    Neurological: Sensation intact to light touch to level of digits, Bilateral.  Protective sensation intact 10/10 sites via 5.07/10g [place]-Akira Monofilament, Bilateral.  negative Tinel's, Bilateral.  negative Valleix sign, Bilateral.      Integument: Warm, dry, supple, Bilateral.  Open lesion absent, Bilateral.  Interdigital maceration absent to web spaces 1-4, Bilateral.  Nails are normal in length, thickness and color 1-5 bilateral.  Fissures absent, Bilateral.       X rays left foot 3 views:  Calcification of the left achilles tendon with heel spurs previously resected appear to have regrown. Asessment: Patient is a 54 y.o. male with:    Diagnosis Orders   1. Injury of left Achilles tendon, initial encounter  XR FOOT LEFT (MIN 3 VIEWS)    CT PNEUMA/VAC WALK BOOT PRE OTS   2.  Acute left ankle pain  CT PNEUMA/VAC WALK BOOT PRE OTS   3. Left foot pain  XR FOOT LEFT (MIN 3 VIEWS)         Plan: Patient examined and evaluated. Current condition and treatment options discussed in detail. Advised pt to his condtion      X rays left foot 3 views show the above findings. A prefabricated tall and pneumatic Ankle-Foot Orthosis/CAM walker was dispensed and applied at this visit. Due to the patient's diagnosis and related symptoms this equipment is medically necessary for treatment. The function of this device is to restrict and limit motion, provide stabilization, immobilization, and compression to the affected area to reduce swelling. The goals and function of this device was explained in detail to the patient. Upon gait analysis, the device appeared to be fitting well and the patient states that the device is comfortable at this time. The patient was shown and told in detail how to properly apply, remove, wear and care for the device. Patient was able to apply the device properly and was able to ambulate without distress. At the time the device was dispensed, it was suitable for the condition and was not substandard. No guarantees were given and precautions were reviewed. Patient was instructed not to drive a car when wearing this device. All questions were answered. Written instructions and warranty information was given along with the list of the thirty (30) Durable Medical Equipment Supplier Guidelines. All questions were answered. Discussed signs and symptoms of deep venous thrombosis and pulmonary embolus. Call if they occur. We also discussed mechanical methods to prevent deep venous thrombosis and pulmonary embolus    All labs were reviewed and all imagining including the above findings were reviewed PRIOR to the patients arrival and with the patient today. Previous patient encounter was reviewed. Encounters from the patients other medical providers were reviewed and noted.       Time was spent educating the patient and their families/caregivers on proper care of the feet and ankles. All the above diagnosis were addressed at todays visit and all questions were answered. A total of 35 minutes was spent with this patients encounter which included charting after the patients visit  . Verbal and written instructions given to patient. Contact office with any questions/problems/concerns. Orders Placed This Encounter   Procedures    XR FOOT LEFT (MIN 3 VIEWS)    RI PNEUMA/VAC WALK BOOT PRE OTS     No orders of the defined types were placed in this encounter. RTC in 3week(s).     9/23/2021      Electronically signed by Saumya Barker DPM on 9/23/2021 at 2:06 PM  9/23/2021

## 2021-10-07 ENCOUNTER — OFFICE VISIT (OUTPATIENT)
Dept: PODIATRY | Age: 46
End: 2021-10-07
Payer: COMMERCIAL

## 2021-10-07 VITALS — BODY MASS INDEX: 45.52 KG/M2 | HEIGHT: 67 IN | WEIGHT: 290 LBS

## 2021-10-07 DIAGNOSIS — S86.012A ACHILLES TENDON TEAR, LEFT, INITIAL ENCOUNTER: Primary | ICD-10-CM

## 2021-10-07 DIAGNOSIS — M25.572 ACUTE LEFT ANKLE PAIN: ICD-10-CM

## 2021-10-07 PROCEDURE — 99213 OFFICE O/P EST LOW 20 MIN: CPT | Performed by: PODIATRIST

## 2021-10-07 NOTE — PROGRESS NOTES
[facility] PHYSICIANS  [facility]  [address] [place] [address]  Dept: [phone]  Dept Fax: [phone]    RETURN PATIENT PROGRESS NOTE  Date of patient's visit: 10/7/2021  Patient's Name:  Huber Acosta YOB: 1975            Patient Care Team:  Sunnie Sandhoff, APRN - CNP as PCP - General (Certified Nurse Practitioner)  Nicolle Arita DPM as Physician (Podiatry)       Huber Acosta 54 y.o. male that presents for follow-up of   Chief Complaint   Patient presents with    Foot Pain     rtc 2 weeks     Symptoms began 3 month(s) ago and are increased . Patient relates pain is Present. Pain is rated 7 out of 10 and is described as moderate. Treatments prior to today's visit include: CAM boot, PT Stretching, x rays. Previous surgery . Currently denies F/C/N/V. Allergies   Allergen Reactions    No Known Allergies        History reviewed. No pertinent past medical history. Prior to Admission medications    Medication Sig Start Date End Date Taking? Authorizing Provider   predniSONE (DELTASONE) 10 MG tablet Take one PO TID x 3 days  Take one PO BID x 3 days  Take one PO q daily x 3 days  Take 1/2 po q daily x 4 days 1/26/21  Yes Nicolle Arita DPM   diclofenac (VOLTAREN) 75 MG EC tablet Take 1 tablet by mouth 2 times daily (with meals) for 14 days 6/26/18 7/10/18  Kelsy Godinez MD       Review of Systems    Review of Systems:  History obtained from chart review and the patient  General ROS: negative for - chills, fatigue, fever, night sweats or weight gain  Constitutional: Negative for chills, diaphoresis, fatigue, fever and unexpected weight change. Musculoskeletal: Positive for arthralgias, gait problem and joint swelling. Neurological ROS: negative for - behavioral changes, confusion, headaches or seizures. Negative for weakness and numbness. Dermatological ROS: negative for - mole changes, rash  Cardiovascular: Negative for leg swelling. Gastrointestinal: Negative for constipation, diarrhea, nausea and vomiting. Lower Extremity Physical Examination:     Vitals: There were no vitals filed for this visit. General: AAO x 3 in NAD. Dermatologic Exam:  Skin lesion/ulceration Absent . Skin No rashes or nodules noted. .       Musculoskeletal:     1st MPJ ROM decreased, Bilateral.  Muscle strength 5/5, Bilateral.  Pain present upon palpation of left achilles tendon 2 cm proximal to the calccaneal spur and achilles insertion. Increased pain with palpation and increased with end range Dorsiflexion of the ankle. Medial longitudinal arch, Bilateral WNL. Ankle ROM WNL,Bilateral.    Dorsally contracted digits absent digits 1-5 Bilateral.     Vascular: DP and PT pulses palpable 2/4, Bilateral.  CFT <3 seconds, Bilateral.  Hair growth present to the level of the digits, Bilateral.  Edema absent, Bilateral.  Varicosities absent, Bilateral. Erythema absent, Bilateral    Neurological: Sensation intact to light touch to level of digits, Bilateral.  Protective sensation intact 10/10 sites via 5.07/10g [place]-Akira Monofilament, Bilateral.  negative Tinel's, Bilateral.  negative Valleix sign, Bilateral.      Integument: Warm, dry, supple, Bilateral.  Open lesion absent, Bilateral.  Interdigital maceration absent to web spaces 1-4, Bilateral.  Nails are normal in length, thickness and color 1-5 bilateral.  Fissures absent, Bilateral.         Asessment: Patient is a 54 y.o. male with:    Diagnosis Orders   1. Achilles tendon tear, left, initial encounter  MRI ANKLE LEFT WO CONTRAST   2. Acute left ankle pain  MRI ANKLE LEFT WO CONTRAST       Plan: Patient examined and evaluated. Current condition and treatment options discussed in detail.    Advised pt to his conditon      since we have tried NSAID, x rays immobilization,  RICE therapy physical therapy and the symptoms have not improved we will need to order an MRI of the affected limb to assess the area  .  Verbal and written instructions given to patient. Contact office with any questions/problems/concerns. No orders of the defined types were placed in this encounter. No orders of the defined types were placed in this encounter. RTC in 1week(s) after mri.     10/7/2021      Electronically signed by Mikhail Shafer DPM on 10/7/2021 at 8:55 AM  10/7/2021

## 2021-10-22 ENCOUNTER — HOSPITAL ENCOUNTER (OUTPATIENT)
Dept: MRI IMAGING | Age: 46
Discharge: HOME | End: 2021-10-24
Payer: COMMERCIAL

## 2021-10-22 DIAGNOSIS — S86.012A ACHILLES TENDON TEAR, LEFT, INITIAL ENCOUNTER: ICD-10-CM

## 2021-10-22 DIAGNOSIS — M25.572 ACUTE LEFT ANKLE PAIN: ICD-10-CM

## 2021-10-22 PROCEDURE — 73721 MRI JNT OF LWR EXTRE W/O DYE: CPT

## 2021-11-09 ENCOUNTER — OFFICE VISIT (OUTPATIENT)
Dept: PODIATRY | Age: 46
End: 2021-11-09
Payer: COMMERCIAL

## 2021-11-09 VITALS — BODY MASS INDEX: 45.52 KG/M2 | WEIGHT: 290 LBS | HEIGHT: 67 IN | RESPIRATION RATE: 18 BRPM

## 2021-11-09 DIAGNOSIS — M79.672 PAIN OF LEFT HEEL: ICD-10-CM

## 2021-11-09 DIAGNOSIS — M79.672 LEFT FOOT PAIN: Primary | ICD-10-CM

## 2021-11-09 DIAGNOSIS — M21.862 GASTROCNEMIUS EQUINUS, LEFT: ICD-10-CM

## 2021-11-09 PROCEDURE — 99214 OFFICE O/P EST MOD 30 MIN: CPT | Performed by: PODIATRIST

## 2021-11-09 NOTE — PROGRESS NOTES
[facility] PHYSICIANS  [facility]  [address] [place] [address]  Dept: [phone]  Dept Fax: [phone]    RETURN PATIENT PROGRESS NOTE  Date of patient's visit: 11/9/2021  Patient's Name:  Quinten Fernandez YOB: 1975            Patient Care Team:  MICHELINE Sheridan CNP as PCP - General (Certified Nurse Practitioner)  Marielos Salcido DPM as Physician (Podiatry)       Quinten Fernandez 54 y.o. male that presents for follow-up of   Chief Complaint   Patient presents with    Foot Pain     left    Follow-up     MRI review      Pt's primary care physician is MICHELINE Gonzalez CNP last seen9/2/2021  Symptoms began 4 month(s) ago and are unchanged . Patient relates pain is present . Pain is rated 2 out of 10 and is described as intermittent. Treatments prior to today's visit include: CAM Boot. He has no pain when in the CAM boot but a lot of pain when he moves his ankle without the boot . Currently denies F/C/N/V. Allergies   Allergen Reactions    No Known Allergies        No past medical history on file. Prior to Admission medications    Medication Sig Start Date End Date Taking? Authorizing Provider   predniSONE (DELTASONE) 10 MG tablet Take one PO TID x 3 days  Take one PO BID x 3 days  Take one PO q daily x 3 days  Take 1/2 po q daily x 4 days  Patient not taking: Reported on 11/9/2021 1/26/21   Marielos Salcido DPM   diclofenac (VOLTAREN) 75 MG EC tablet Take 1 tablet by mouth 2 times daily (with meals) for 14 days 6/26/18 7/10/18  Salvador Brady MD       Review of Systems    Review of Systems:  History obtained from chart review and the patient  General ROS: negative for - chills, fatigue, fever, night sweats or weight gain  Constitutional: Negative for chills, diaphoresis, fatigue, fever and unexpected weight change. Musculoskeletal: Positive for arthralgias, gait problem and joint swelling.   Neurological ROS: negative for - behavioral changes, confusion, headaches or seizures. Negative for weakness and numbness. Dermatological ROS: negative for - mole changes, rash  Cardiovascular: Negative for leg swelling. Gastrointestinal: Negative for constipation, diarrhea, nausea and vomiting. Lower Extremity Physical Examination:     Vitals:   Vitals:    11/09/21 1429   Resp: 18     General: AAO x 3 in NAD. Dermatologic Exam:  Skin lesion/ulceration Absent . Skin No rashes or nodules noted. .       Musculoskeletal:     1st MPJ ROM decreased, Bilateral.  Muscle strength 5/5, Bilateral.  Pain present upon palpation of left heel at the achilles insertion. There is limited motion to the ankle joint in dorsiflexion with the knee flexed . Medial longitudinal arch, Bilateral WNL. Ankle ROM WNL,Bilateral.    Dorsally contracted digits absent digits 1-5 Bilateral.     Vascular: DP and PT pulses palpable 2/4, Bilateral.  CFT <3 seconds, Bilateral.  Hair growth present to the level of the digits, Bilateral.  Edema absent, Bilateral.  Varicosities absent, Bilateral. Erythema absent, Bilateral    Neurological: Sensation intact to light touch to level of digits, Bilateral.  Protective sensation intact 10/10 sites via 5.07/10g Orlando-Akira Monofilament, Bilateral.  negative Tinel's, Bilateral.  negative Valleix sign, Bilateral.      Integument: Warm, dry, supple, Bilateral.  Open lesion absent, Bilateral.  Interdigital maceration absent to web spaces 1-4, Bilateral.  Nails are normal in length, thickness and color 1-5 bilateral.  Fissures absent, Bilateral.     Left ankle MRI:    Reviewed      Impression   1. Prior repair of the distal Achilles tendon with anchors in place. Moderate to severe distal Achilles tendinosis at its insertion on the   calcaneus with well corticated ossicles seen within the distal portion of the   tendon.  No discrete tear of the Achilles identified.  No retrocalcaneal   bursitis.    2. Mild tenosynovitis of the intact medial and lateral tendons.  Underlying   moderate to severe tendinosis of the distal posterior tibialis tendon. 3. Mild hindfoot and midfoot osteoarthritis.           Asessment: Patient is a 54 y.o. male with:    Diagnosis Orders   1. Left foot pain     2. Gastrocnemius equinus, left     3. Pain of left heel           Plan: Patient examined and evaluated. Current condition and treatment options discussed in detail. Advised pt to his condtion and reviewed the MRI with the patient . Verbal and written instructions given to patient. Contact office with any questions/problems/concerns. All labs were reviewed and all imagining including the above findings were reviewed prior to the patients arrival and with the patient today      Time was spent educating the patient and their families/caregivers on proper care of the feet and ankles. All the above diagnosis were addressed at todays visit and all questions were answered. I had a long discussion today with the patient about the likely diagnosis and its natural history, physical exam and imaging findings, as well as treatment options. We discussed both surgical and nonsurgical treatments, including risks and benefits. From a nonoperative standpoint, we discussed rest/activity modification, NSAIDs/Acetaminophen/topical anesthetics, orthotics, bracing/immobilization, and physical therapy. Surgically, we discussed gastroc recession, achilles repair, calcaneal spur resection and FHL tendon transfer possibly       I discussed in detail the procedure. He/She was in full agreement and understood risks. The reason for surgery is due to unsuccessful non-operative treatment and/or conservative treatment is not a viable option. It was discussed with the patient that compliance postoperatively is of utmost importance. Any deviation on behalf of the patient will decrease the chances of a successful outcome.  Patient acknowledged, understands, and would like to move forward with surgery as discussed. The patient was given a consent outlining the general risk of surgery as well as the specifics to the surgical plan. This was carefully discussed giving all options, indications and contraindications regarding the procedure outlined in the consent. All questions were answered to the patient's satisfaction. The patient signed the consent form confirming complete understanding and acceptance of the risks of stated. I specifically stated and inquired if the patient understands and accepts the risks of surgery including infection, failure, prolonged pain, swelling, numbness, recurrence, limited mobility,painful scar, RSDS, overcorrection, under-correction, and loss of limb/life. Death, bleeding, blood clots in the veins or lungs, tendon or blood vessel disturbance, bony conditions, continued pain,stiffness, weakness, and limited function. These were all listed on the consent. Additionally, the postoperative course and treatment was outlined for the patient. Discussion consisted of postoperatively the patient needs to keep the foot elevated for at least the first initial two weeks. I have encouraged movements such as moving from the bed to the sofa or recliner, to the kitchen and the bathroom; quick bursts of movement with the foot elevated. Longstanding periods of time such as cooking, cleaning, and shopping are not permitted. I reminded the patient that there are only two reasons to have surgery. That being, if their function is impaired and also if they are having pain. If they can answer yes to both these questions, I will move forward with surgery. If they can not, there is no reason to proceed with surgical intervention. Pt does elect to have the procedure above    A total of 35 minutes was spent with this patients encounter      No orders of the defined types were placed in this encounter. No orders of the defined types were placed in this encounter.        RTC in 1 week(s) post op .    11/9/2021      Electronically signed by Hira NOHEMI Damian on 11/9/2021 at 2:31 PM  11/9/2021

## 2021-11-15 DIAGNOSIS — Z98.890 POSTOPERATIVE STATE: Primary | ICD-10-CM

## 2021-11-15 DIAGNOSIS — Z98.890 STATUS POST ACHILLES TENDON REPAIR: ICD-10-CM

## 2021-12-02 ENCOUNTER — ANESTHESIA EVENT (OUTPATIENT)
Dept: OPERATING ROOM | Age: 46
End: 2021-12-02
Payer: COMMERCIAL

## 2021-12-03 ENCOUNTER — APPOINTMENT (OUTPATIENT)
Dept: GENERAL RADIOLOGY | Age: 46
End: 2021-12-03
Attending: PODIATRIST
Payer: COMMERCIAL

## 2021-12-03 ENCOUNTER — HOSPITAL ENCOUNTER (OUTPATIENT)
Age: 46
Setting detail: OUTPATIENT SURGERY
Discharge: HOME | End: 2021-12-03
Attending: PODIATRIST | Admitting: PODIATRIST
Payer: COMMERCIAL

## 2021-12-03 ENCOUNTER — ANESTHESIA (OUTPATIENT)
Dept: OPERATING ROOM | Age: 46
End: 2021-12-03
Payer: COMMERCIAL

## 2021-12-03 VITALS
TEMPERATURE: 97.2 F | SYSTOLIC BLOOD PRESSURE: 122 MMHG | BODY MASS INDEX: 45.1 KG/M2 | DIASTOLIC BLOOD PRESSURE: 69 MMHG | HEART RATE: 68 BPM | WEIGHT: 315 LBS | OXYGEN SATURATION: 91 % | HEIGHT: 70 IN | RESPIRATION RATE: 11 BRPM

## 2021-12-03 VITALS — OXYGEN SATURATION: 89 % | TEMPERATURE: 97.7 F | DIASTOLIC BLOOD PRESSURE: 56 MMHG | SYSTOLIC BLOOD PRESSURE: 105 MMHG

## 2021-12-03 DIAGNOSIS — G89.18 ACUTE POST-OPERATIVE PAIN: Primary | ICD-10-CM

## 2021-12-03 PROCEDURE — 2720000010 HC SURG SUPPLY STERILE: Performed by: PODIATRIST

## 2021-12-03 PROCEDURE — 7100000010 HC PHASE II RECOVERY - FIRST 15 MIN: Performed by: PODIATRIST

## 2021-12-03 PROCEDURE — 7100000011 HC PHASE II RECOVERY - ADDTL 15 MIN: Performed by: PODIATRIST

## 2021-12-03 PROCEDURE — 7100000000 HC PACU RECOVERY - FIRST 15 MIN: Performed by: PODIATRIST

## 2021-12-03 PROCEDURE — 7100000001 HC PACU RECOVERY - ADDTL 15 MIN: Performed by: PODIATRIST

## 2021-12-03 PROCEDURE — C1776 JOINT DEVICE (IMPLANTABLE): HCPCS | Performed by: PODIATRIST

## 2021-12-03 PROCEDURE — 27654 REPAIR OF ACHILLES TENDON: CPT | Performed by: PODIATRIST

## 2021-12-03 PROCEDURE — 27691 REVISE LOWER LEG TENDON: CPT | Performed by: PODIATRIST

## 2021-12-03 PROCEDURE — 27687 REVISION OF CALF TENDON: CPT | Performed by: PODIATRIST

## 2021-12-03 PROCEDURE — 28118 REMOVAL OF HEEL BONE: CPT | Performed by: PODIATRIST

## 2021-12-03 PROCEDURE — 2500000003 HC RX 250 WO HCPCS: Performed by: SPECIALIST

## 2021-12-03 PROCEDURE — 2580000003 HC RX 258: Performed by: STUDENT IN AN ORGANIZED HEALTH CARE EDUCATION/TRAINING PROGRAM

## 2021-12-03 PROCEDURE — C1713 ANCHOR/SCREW BN/BN,TIS/BN: HCPCS | Performed by: PODIATRIST

## 2021-12-03 PROCEDURE — 2500000003 HC RX 250 WO HCPCS: Performed by: ANESTHESIOLOGY

## 2021-12-03 PROCEDURE — 64445 NJX AA&/STRD SCIATIC NRV IMG: CPT | Performed by: ANESTHESIOLOGY

## 2021-12-03 PROCEDURE — 2580000003 HC RX 258: Performed by: ANESTHESIOLOGY

## 2021-12-03 PROCEDURE — 6360000002 HC RX W HCPCS: Performed by: ANESTHESIOLOGY

## 2021-12-03 PROCEDURE — 6360000002 HC RX W HCPCS: Performed by: SPECIALIST

## 2021-12-03 PROCEDURE — 3700000001 HC ADD 15 MINUTES (ANESTHESIA): Performed by: PODIATRIST

## 2021-12-03 PROCEDURE — 2709999900 HC NON-CHARGEABLE SUPPLY: Performed by: PODIATRIST

## 2021-12-03 PROCEDURE — 3700000000 HC ANESTHESIA ATTENDED CARE: Performed by: PODIATRIST

## 2021-12-03 PROCEDURE — 3600000002 HC SURGERY LEVEL 2 BASE: Performed by: PODIATRIST

## 2021-12-03 PROCEDURE — C9290 INJ, BUPIVACAINE LIPOSOME: HCPCS | Performed by: ANESTHESIOLOGY

## 2021-12-03 PROCEDURE — 3209999900 FLUORO FOR SURGICAL PROCEDURES

## 2021-12-03 PROCEDURE — 3600000012 HC SURGERY LEVEL 2 ADDTL 15MIN: Performed by: PODIATRIST

## 2021-12-03 PROCEDURE — 6360000002 HC RX W HCPCS: Performed by: STUDENT IN AN ORGANIZED HEALTH CARE EDUCATION/TRAINING PROGRAM

## 2021-12-03 DEVICE — SYSTEM ORTHOPEDIC 5.5 MM FDL: Type: IMPLANTABLE DEVICE | Site: ANKLE | Status: FUNCTIONAL

## 2021-12-03 DEVICE — DEVICE GRFT FIX 4.75X19.1 MM ANCHR IMPL BIOCOMP SWIVELOCK: Type: IMPLANTABLE DEVICE | Site: ANKLE | Status: FUNCTIONAL

## 2021-12-03 DEVICE — ANCHOR SUTURE BIOCOMP 4.75X19.1 MM SWIVELOCK C: Type: IMPLANTABLE DEVICE | Site: ANKLE | Status: FUNCTIONAL

## 2021-12-03 RX ORDER — LABETALOL HYDROCHLORIDE 5 MG/ML
5 INJECTION, SOLUTION INTRAVENOUS EVERY 10 MIN PRN
Status: DISCONTINUED | OUTPATIENT
Start: 2021-12-03 | End: 2021-12-03 | Stop reason: HOSPADM

## 2021-12-03 RX ORDER — HYDROMORPHONE HYDROCHLORIDE 1 MG/ML
0.5 INJECTION, SOLUTION INTRAMUSCULAR; INTRAVENOUS; SUBCUTANEOUS EVERY 5 MIN PRN
Status: DISCONTINUED | OUTPATIENT
Start: 2021-12-03 | End: 2021-12-03 | Stop reason: HOSPADM

## 2021-12-03 RX ORDER — OXYCODONE HYDROCHLORIDE AND ACETAMINOPHEN 5; 325 MG/1; MG/1
1 TABLET ORAL PRN
Status: DISCONTINUED | OUTPATIENT
Start: 2021-12-03 | End: 2021-12-03 | Stop reason: HOSPADM

## 2021-12-03 RX ORDER — HYDRALAZINE HYDROCHLORIDE 20 MG/ML
5 INJECTION INTRAMUSCULAR; INTRAVENOUS EVERY 10 MIN PRN
Status: DISCONTINUED | OUTPATIENT
Start: 2021-12-03 | End: 2021-12-03 | Stop reason: HOSPADM

## 2021-12-03 RX ORDER — OXYCODONE HYDROCHLORIDE AND ACETAMINOPHEN 5; 325 MG/1; MG/1
2 TABLET ORAL PRN
Status: DISCONTINUED | OUTPATIENT
Start: 2021-12-03 | End: 2021-12-03 | Stop reason: HOSPADM

## 2021-12-03 RX ORDER — GLYCOPYRROLATE 1 MG/5 ML
SYRINGE (ML) INTRAVENOUS PRN
Status: DISCONTINUED | OUTPATIENT
Start: 2021-12-03 | End: 2021-12-03 | Stop reason: SDUPTHER

## 2021-12-03 RX ORDER — LIDOCAINE HYDROCHLORIDE 10 MG/ML
INJECTION, SOLUTION INFILTRATION; PERINEURAL PRN
Status: DISCONTINUED | OUTPATIENT
Start: 2021-12-03 | End: 2021-12-03 | Stop reason: SDUPTHER

## 2021-12-03 RX ORDER — MIDAZOLAM HYDROCHLORIDE 1 MG/ML
INJECTION INTRAMUSCULAR; INTRAVENOUS PRN
Status: DISCONTINUED | OUTPATIENT
Start: 2021-12-03 | End: 2021-12-03

## 2021-12-03 RX ORDER — KETOROLAC TROMETHAMINE 30 MG/ML
INJECTION, SOLUTION INTRAMUSCULAR; INTRAVENOUS PRN
Status: DISCONTINUED | OUTPATIENT
Start: 2021-12-03 | End: 2021-12-03 | Stop reason: SDUPTHER

## 2021-12-03 RX ORDER — SODIUM CHLORIDE 0.9 % (FLUSH) 0.9 %
10 SYRINGE (ML) INJECTION PRN
Status: DISCONTINUED | OUTPATIENT
Start: 2021-12-03 | End: 2021-12-03 | Stop reason: HOSPADM

## 2021-12-03 RX ORDER — SODIUM CHLORIDE 9 MG/ML
INJECTION, SOLUTION INTRAVENOUS CONTINUOUS
Status: DISCONTINUED | OUTPATIENT
Start: 2021-12-03 | End: 2021-12-03 | Stop reason: HOSPADM

## 2021-12-03 RX ORDER — LIDOCAINE HYDROCHLORIDE 20 MG/ML
INJECTION, SOLUTION EPIDURAL; INFILTRATION; INTRACAUDAL; PERINEURAL PRN
Status: DISCONTINUED | OUTPATIENT
Start: 2021-12-03 | End: 2021-12-03 | Stop reason: SDUPTHER

## 2021-12-03 RX ORDER — NEOSTIGMINE METHYLSULFATE 5 MG/5 ML
SYRINGE (ML) INTRAVENOUS PRN
Status: DISCONTINUED | OUTPATIENT
Start: 2021-12-03 | End: 2021-12-03 | Stop reason: SDUPTHER

## 2021-12-03 RX ORDER — HYDROCODONE BITARTRATE AND ACETAMINOPHEN 5; 325 MG/1; MG/1
1 TABLET ORAL EVERY 6 HOURS PRN
Qty: 28 TABLET | Refills: 0 | Status: SHIPPED | OUTPATIENT
Start: 2021-12-03 | End: 2021-12-10

## 2021-12-03 RX ORDER — PROMETHAZINE HYDROCHLORIDE 25 MG/ML
6.25 INJECTION, SOLUTION INTRAMUSCULAR; INTRAVENOUS
Status: DISCONTINUED | OUTPATIENT
Start: 2021-12-03 | End: 2021-12-03 | Stop reason: HOSPADM

## 2021-12-03 RX ORDER — LIDOCAINE HYDROCHLORIDE 10 MG/ML
1 INJECTION, SOLUTION EPIDURAL; INFILTRATION; INTRACAUDAL; PERINEURAL
Status: DISCONTINUED | OUTPATIENT
Start: 2021-12-03 | End: 2021-12-03 | Stop reason: HOSPADM

## 2021-12-03 RX ORDER — FENTANYL CITRATE 50 UG/ML
INJECTION, SOLUTION INTRAMUSCULAR; INTRAVENOUS PRN
Status: DISCONTINUED | OUTPATIENT
Start: 2021-12-03 | End: 2021-12-03 | Stop reason: SDUPTHER

## 2021-12-03 RX ORDER — SODIUM CHLORIDE 0.9 % (FLUSH) 0.9 %
10 SYRINGE (ML) INJECTION EVERY 12 HOURS SCHEDULED
Status: DISCONTINUED | OUTPATIENT
Start: 2021-12-03 | End: 2021-12-03 | Stop reason: HOSPADM

## 2021-12-03 RX ORDER — PROPOFOL 10 MG/ML
INJECTION, EMULSION INTRAVENOUS PRN
Status: DISCONTINUED | OUTPATIENT
Start: 2021-12-03 | End: 2021-12-03 | Stop reason: SDUPTHER

## 2021-12-03 RX ORDER — FENTANYL CITRATE 50 UG/ML
25 INJECTION, SOLUTION INTRAMUSCULAR; INTRAVENOUS EVERY 5 MIN PRN
Status: DISCONTINUED | OUTPATIENT
Start: 2021-12-03 | End: 2021-12-03 | Stop reason: HOSPADM

## 2021-12-03 RX ORDER — ONDANSETRON 2 MG/ML
4 INJECTION INTRAMUSCULAR; INTRAVENOUS
Status: DISCONTINUED | OUTPATIENT
Start: 2021-12-03 | End: 2021-12-03 | Stop reason: HOSPADM

## 2021-12-03 RX ORDER — KETAMINE HCL IN NACL, ISO-OSM 100MG/10ML
SYRINGE (ML) INJECTION PRN
Status: DISCONTINUED | OUTPATIENT
Start: 2021-12-03 | End: 2021-12-03 | Stop reason: SDUPTHER

## 2021-12-03 RX ORDER — BUPIVACAINE HYDROCHLORIDE 5 MG/ML
INJECTION, SOLUTION EPIDURAL; INTRACAUDAL PRN
Status: DISCONTINUED | OUTPATIENT
Start: 2021-12-03 | End: 2021-12-03 | Stop reason: SDUPTHER

## 2021-12-03 RX ORDER — MIDAZOLAM HYDROCHLORIDE 1 MG/ML
2 INJECTION INTRAMUSCULAR; INTRAVENOUS ONCE
Status: COMPLETED | OUTPATIENT
Start: 2021-12-03 | End: 2021-12-03

## 2021-12-03 RX ORDER — DEXAMETHASONE SODIUM PHOSPHATE 10 MG/ML
INJECTION INTRAMUSCULAR; INTRAVENOUS PRN
Status: DISCONTINUED | OUTPATIENT
Start: 2021-12-03 | End: 2021-12-03 | Stop reason: SDUPTHER

## 2021-12-03 RX ORDER — ONDANSETRON 2 MG/ML
INJECTION INTRAMUSCULAR; INTRAVENOUS PRN
Status: DISCONTINUED | OUTPATIENT
Start: 2021-12-03 | End: 2021-12-03 | Stop reason: SDUPTHER

## 2021-12-03 RX ORDER — ROCURONIUM BROMIDE 10 MG/ML
INJECTION, SOLUTION INTRAVENOUS PRN
Status: DISCONTINUED | OUTPATIENT
Start: 2021-12-03 | End: 2021-12-03 | Stop reason: SDUPTHER

## 2021-12-03 RX ORDER — SODIUM CHLORIDE, SODIUM LACTATE, POTASSIUM CHLORIDE, CALCIUM CHLORIDE 600; 310; 30; 20 MG/100ML; MG/100ML; MG/100ML; MG/100ML
INJECTION, SOLUTION INTRAVENOUS CONTINUOUS
Status: DISCONTINUED | OUTPATIENT
Start: 2021-12-03 | End: 2021-12-03 | Stop reason: HOSPADM

## 2021-12-03 RX ORDER — SODIUM CHLORIDE 9 MG/ML
25 INJECTION, SOLUTION INTRAVENOUS PRN
Status: DISCONTINUED | OUTPATIENT
Start: 2021-12-03 | End: 2021-12-03 | Stop reason: HOSPADM

## 2021-12-03 RX ADMIN — BUPIVACAINE HYDROCHLORIDE 20 ML: 5 INJECTION, SOLUTION EPIDURAL; INTRACAUDAL; PERINEURAL at 11:12

## 2021-12-03 RX ADMIN — Medication 2 MG: at 13:43

## 2021-12-03 RX ADMIN — Medication 50 MCG: at 13:16

## 2021-12-03 RX ADMIN — Medication 50 MCG: at 11:27

## 2021-12-03 RX ADMIN — LIDOCAINE HYDROCHLORIDE 100 MG: 20 INJECTION, SOLUTION EPIDURAL; INFILTRATION; INTRACAUDAL; PERINEURAL at 11:27

## 2021-12-03 RX ADMIN — MIDAZOLAM 2 MG: 1 INJECTION INTRAMUSCULAR; INTRAVENOUS at 11:09

## 2021-12-03 RX ADMIN — PROPOFOL 200 MG: 10 INJECTION, EMULSION INTRAVENOUS at 11:27

## 2021-12-03 RX ADMIN — Medication 25 MG: at 13:17

## 2021-12-03 RX ADMIN — LIDOCAINE HYDROCHLORIDE 3 ML: 10 INJECTION, SOLUTION INFILTRATION; PERINEURAL at 11:12

## 2021-12-03 RX ADMIN — ONDANSETRON 4 MG: 2 INJECTION, SOLUTION INTRAMUSCULAR; INTRAVENOUS at 13:39

## 2021-12-03 RX ADMIN — ROCURONIUM BROMIDE 50 MG: 10 INJECTION, SOLUTION INTRAVENOUS at 11:27

## 2021-12-03 RX ADMIN — SODIUM CHLORIDE, POTASSIUM CHLORIDE, SODIUM LACTATE AND CALCIUM CHLORIDE: 600; 310; 30; 20 INJECTION, SOLUTION INTRAVENOUS at 10:27

## 2021-12-03 RX ADMIN — Medication 0.2 MG: at 13:43

## 2021-12-03 RX ADMIN — Medication 25 MG: at 11:49

## 2021-12-03 RX ADMIN — CEFAZOLIN SODIUM 3000 MG: 10 INJECTION, POWDER, FOR SOLUTION INTRAVENOUS at 11:38

## 2021-12-03 RX ADMIN — KETOROLAC TROMETHAMINE 30 MG: 30 INJECTION, SOLUTION INTRAMUSCULAR; INTRAVENOUS at 13:43

## 2021-12-03 RX ADMIN — DEXAMETHASONE SODIUM PHOSPHATE 10 MG: 10 INJECTION INTRAMUSCULAR; INTRAVENOUS at 11:44

## 2021-12-03 RX ADMIN — BUPIVACAINE 10 ML: 13.3 INJECTION, SUSPENSION, LIPOSOMAL INFILTRATION at 11:12

## 2021-12-03 ASSESSMENT — PULMONARY FUNCTION TESTS
PIF_VALUE: 24
PIF_VALUE: 23
PIF_VALUE: 23
PIF_VALUE: 2
PIF_VALUE: 24
PIF_VALUE: 24
PIF_VALUE: 25
PIF_VALUE: 26
PIF_VALUE: 23
PIF_VALUE: 25
PIF_VALUE: 24
PIF_VALUE: 24
PIF_VALUE: 23
PIF_VALUE: 24
PIF_VALUE: 16
PIF_VALUE: 23
PIF_VALUE: 17
PIF_VALUE: 16
PIF_VALUE: 24
PIF_VALUE: 16
PIF_VALUE: 24
PIF_VALUE: 17
PIF_VALUE: 25
PIF_VALUE: 23
PIF_VALUE: 17
PIF_VALUE: 17
PIF_VALUE: 24
PIF_VALUE: 24
PIF_VALUE: 23
PIF_VALUE: 22
PIF_VALUE: 28
PIF_VALUE: 17
PIF_VALUE: 17
PIF_VALUE: 23
PIF_VALUE: 1
PIF_VALUE: 23
PIF_VALUE: 23
PIF_VALUE: 17
PIF_VALUE: 25
PIF_VALUE: 23
PIF_VALUE: 23
PIF_VALUE: 24
PIF_VALUE: 23
PIF_VALUE: 24
PIF_VALUE: 24
PIF_VALUE: 25
PIF_VALUE: 23
PIF_VALUE: 17
PIF_VALUE: 23
PIF_VALUE: 17
PIF_VALUE: 16
PIF_VALUE: 17
PIF_VALUE: 18
PIF_VALUE: 23
PIF_VALUE: 24
PIF_VALUE: 30
PIF_VALUE: 24
PIF_VALUE: 28
PIF_VALUE: 16
PIF_VALUE: 22
PIF_VALUE: 24
PIF_VALUE: 17
PIF_VALUE: 16
PIF_VALUE: 23
PIF_VALUE: 22
PIF_VALUE: 17
PIF_VALUE: 24
PIF_VALUE: 25
PIF_VALUE: 24
PIF_VALUE: 24
PIF_VALUE: 17
PIF_VALUE: 24
PIF_VALUE: 23
PIF_VALUE: 17
PIF_VALUE: 24
PIF_VALUE: 24
PIF_VALUE: 26
PIF_VALUE: 17
PIF_VALUE: 24
PIF_VALUE: 23
PIF_VALUE: 23
PIF_VALUE: 24
PIF_VALUE: 24
PIF_VALUE: 25
PIF_VALUE: 22
PIF_VALUE: 24
PIF_VALUE: 17
PIF_VALUE: 24
PIF_VALUE: 24
PIF_VALUE: 17
PIF_VALUE: 9
PIF_VALUE: 24
PIF_VALUE: 25
PIF_VALUE: 17
PIF_VALUE: 24
PIF_VALUE: 23
PIF_VALUE: 23
PIF_VALUE: 25
PIF_VALUE: 23
PIF_VALUE: 24
PIF_VALUE: 24
PIF_VALUE: 15
PIF_VALUE: 22
PIF_VALUE: 16
PIF_VALUE: 17
PIF_VALUE: 16
PIF_VALUE: 24
PIF_VALUE: 23
PIF_VALUE: 23
PIF_VALUE: 24
PIF_VALUE: 17
PIF_VALUE: 16
PIF_VALUE: 23
PIF_VALUE: 24
PIF_VALUE: 23
PIF_VALUE: 24
PIF_VALUE: 24
PIF_VALUE: 17
PIF_VALUE: 23
PIF_VALUE: 17
PIF_VALUE: 2
PIF_VALUE: 17
PIF_VALUE: 24
PIF_VALUE: 24
PIF_VALUE: 16
PIF_VALUE: 17
PIF_VALUE: 24
PIF_VALUE: 17
PIF_VALUE: 24
PIF_VALUE: 23
PIF_VALUE: 13
PIF_VALUE: 23
PIF_VALUE: 16
PIF_VALUE: 23
PIF_VALUE: 17
PIF_VALUE: 22
PIF_VALUE: 17
PIF_VALUE: 24
PIF_VALUE: 25
PIF_VALUE: 24
PIF_VALUE: 21
PIF_VALUE: 17
PIF_VALUE: 24
PIF_VALUE: 23
PIF_VALUE: 24
PIF_VALUE: 23
PIF_VALUE: 24
PIF_VALUE: 24
PIF_VALUE: 23
PIF_VALUE: 22
PIF_VALUE: 23
PIF_VALUE: 24
PIF_VALUE: 9
PIF_VALUE: 24

## 2021-12-03 ASSESSMENT — PAIN - FUNCTIONAL ASSESSMENT: PAIN_FUNCTIONAL_ASSESSMENT: 0-10

## 2021-12-03 ASSESSMENT — PAIN SCALES - GENERAL
PAINLEVEL_OUTOF10: 0

## 2021-12-03 ASSESSMENT — PAIN DESCRIPTION - DESCRIPTORS: DESCRIPTORS: TIGHTNESS;SHARP;SHOOTING

## 2021-12-03 NOTE — OP NOTE
Operative Note      Patient: Sera Carlton  YOB: 1975  MRN: [id number]    Date of Procedure: 12/3/2021    Pre-Op Diagnosis: DX CALCANEAL SPUR LEFT, achilles tendon tear left, contracture of muscle left ankle    Post-Op Diagnosis: Same       Procedure:    1) secondary repair of left achilles tendon,   2) calcaneal spur resection left  3) Flexor hallucis longus  tendon transfer left ankle  4) gastroc recession left leg  Surgeon(s):  Darren Lei DPM    Assistant:   Surgical Assistant: Tete Hernandez  Resident: Elli Suarez DPM; Dony Mg DPM; Julian Madrid DPM    Anesthesia: General    Estimated Blood Loss (mL): less than 50     Complications: None    Specimens:   * No specimens in log *    Implants:  Implant Name Type Inv. Item Serial No.  Lot No. LRB No. Used Action   [employer] 5.5 MM FDL  [employer] 5.5 MM FDL  [employer] [id number] Left 1 Implanted   [employer] SUTURE BIOCOMP 4.75X19.1 MM [employer]  ANCHOR SUTURE BIOCOMP 4.75X19.1 MM [employer] [id number] Left 1 Implanted   [employer] SUTURE BIOCOMP 4.75X19.1 MM SW[employer]EL[employer] C  ANCHOR SUTURE BIOCOMP 4.75X19.1 MM [employer] [id number] Left 1 Implanted   ANCHOR SUTURE BIOCOMP 4.75X19.1 MM SWIVELOCK C  ANCHOR SUTURE BIOCOMP 4.75X19.1 MM [employer] [id number] Left 1 Implanted         Drains: * No LDAs found *    Findings: Severe tendinosis and calcification of achilles tendon. Please see op note for full detail. INDICATIONS FOR PROCEDURE: Patient is a 54year old male who has had a progressive posterior heel pain and Achilles tendinitis of the left foot for several years with previous failed surgical intervention. Preoperative radiographs of the left foot were obtained which demonstrated significant enthesophyte formation at the insertion of the Achilles tendon, significant calcification at the superior aspect of the calcaneal tuberosity and achilles tendon insertion. Furthermore preoperative MRI of the left foot was obtained which demonstrated insertional bursitis of the Achilles tendon, moderate to severe tendinosis of the distal Achilles tendon. Pain has progressed to the point of limiting ambulation, difficulty in shoe gear, and negatively affecting daily activity. Patient has failed numerous conservative treatments including anti-inflammatory medication, offloading padding and inserts, shoe gear modifications, bracing, physical therapy, lifestyle modifications and elects to undergo surgical correction. We once again discussed surgical intervention including excision of the exostosis of the superior aspect of the calcaneus, gastrocnemius recession, FHL transfer and repair of the Achilles tendon of the left foot including all the risks, benefits, alternatives. We recommend surgical intervention to aid in improving function, pain, activities of daily living, quality of life, as well to help correct deformity. All questions were answered to the patient's satisfaction. He verbalizes and demonstrates understanding. No guarantees were implied or given. He provided written informed consent which was signed and placed in the chart. Covid testing is negative. NPO status confirmed. A pre-operative regional nerve block was administered to the left lower extremity per the Anesthesia team, please refer to their documentation for full detail. PROCEDURE IN DETAIL:  Under mild sedation the patient was brought to the operating room and left on the cart. General anesthesia was administered by the Anesthesia team without complication. The patient was then transitioned to the operating table in a prone position on gel rolls. All bony prominences were well-padded. We confirmed with the Anesthesia team that they had adequate access to her airway and IV lines. The patient was then secured to the bed with a safety strap across the lap.   A well-padded pneumatic tourniquet was applied to the left thigh. The left lower extremity was then prepped, scrubbed, draped in usual aseptic fashion. A timeout was performed confirming the correct patient, correct site, correct procedure. Everyone in the room was in agreement. The right lower extremity was exsanguinated using Esmarch bandage and the pneumatic thigh tourniquet was inflated to 300 mmHg and would remain inflated for 120 minutes throughout the procedure. There was noted to be a significant equinus contracture at the level of the left ankle. Attention was directed to the distal posterior leg at the level of the myotendinous junction of the Achilles tendon. A 4cm linear incision was made over the myotendinous junction utilizing a #15 blade. The incision was deepened to the level of fascia and tendon utilizing a combination of sharp and blunt dissection. The sural nerve was identified, mobilized, and carefully retracted posteriorly. Dissection as carried out until the medial and lateral borders of the gastrocnemius tendon were clearly visualized. At this time the gastrocnemius recession was performed from medial to lateral utilizing a #15 blade until the tendon was fully released. There was immediate correction of the equinus contracture at the level of the ankle. The underlying muscle belly appeared healthy for the patients age. The surgical sites was the irrigated with copious amounts of sterile saline. The surgical site was then closed in layers utilizing 3-0 Monocryl for subcutaneous closure and 3-0 Prolene for skin closure. Attention was then directed to the left posterior heel. At this time a linear incision was made over the midline of the Achilles tendon and the calcaneus utilizing a #15 blade. The incision was deepened to the level of tendon using a combination of blunt and sharp dissection. Care was taken to protect and retract all neurovascular structures out of the surgical field.   Hemostasis was controlled utilizing Bovie electrocautery throughout the dissection process. The Achilles tendon was then split midline and reflected medially and laterally to allow for exposure of the underlying exostosis, care was taken to maintain the most medial and most lateral attachments of the Achilles tendon. Upon further inspection there was noted to be severe tendinosis of the Achilles tendon with extensive fibrosis throughout the distal insertion site. Along with previously placed anchors and suture material. Much of the distal Achilles tendon was yellow in color with loss of normal tendon fibers and several areas of tendon ossification due to longstanding inflammation. At this time we move forward with excision of the retrocalcaneal exostosis. The large retrocalcaneal exostosis was resected utilizing a combination of sagittal saw. Next we moved forward with transfer of the flexor hallucis longus tendon. The neurovascular bundle was identified and safely retracted out of the surgical field medially. The underlying fascia of the FHL muscle belly was then sharply incised until the FHL tendon was isolated. Dissection was carried as far distally as possible and the FHL tendon was then harvested with the ankle and hallux in plantarflexion. The tendon was then directed posteriorly into the proximal aspect of the calcaneus. Under direction of intra-operative imaging and following 's instructions the FHL tendon was then transferred into the calcaneus utilizing a 5.5 bio composite Arthrex suture anchor and a 4.75 swivel lock anchor allowing for 2 point of fixation. Appropriate tension and fixation was noted across the tendon transfer site. At this time we proceeded with repair of the Achilles tendon. Due to the degree of severe tendinosis of the Achilles tendon required both repair and remodeling. The Achilles tendon was debrided of all fibrotic and nonviable tissue until viable tendon fibers were visualized.  The excised tissue was sent for pathology. All previously noted intratendinous ossicles were excised. The Achilles tendon was then meticulously remodeled to allow for appropriate reapproximation and physiologic tension to the incision site on the calcaneus. Next following manufactures instructions four Arthrex peek bone anchors and associated suture tape were placed into the superior aspect of the calcaneus. The Achilles tendon was repaired and anchored at the insertion site using a double row technique with the Arthrex suture tape. The remaining Arthrex FiberWire was then used to further stabilize the repair in the Achilles tendon. The Achilles tendon was noted to be under appropriate physiologic tension. The midline split was then repaired utilizing 2-0 Monocryl. The surgical site was then irrigated with copious amounts of sterile saline. Any apparent weaknesses in the Achilles tendon insertion were then repaired with 2-0 Monocryl. The surgical site was then closed in layers utilizing 3-0 Monocryl for deep and subcutaneous closure, 3-0 Prolene for skin closure. Dressings were then applied to the left foot consisting of Adaptic, gauze 4 x 4's, Kerlix, Ace. The thigh tourniquet was deflated and immediate hyperemic response was noted to the left lower extremity. Next a short leg posterior splint was applied to the left lower extremity with care taken to keep the foot in gravity equinus to protect the repair. The patient tolerated the above procedure and anesthesia well without complications. The patient was transported from the operating room to the PACU with vital signs stable and vascular status intact to the left lower extremity.     Electronically signed by Shaneka Lewis DPM on 12/3/2021 at 2:38 PM

## 2021-12-03 NOTE — ANESTHESIA POSTPROCEDURE EVALUATION
Department of Anesthesiology  Postprocedure Note    Patient: Sera Carlton  MRN: [id number]  YOB: 1975  Date of evaluation: 12/3/2021  Time:  2:56 PM     Procedure Summary     Date: 12/03/21 Room / Location: [facility] 01 / [facility] - INPATIENT    Anesthesia Start: 8085 Anesthesia Stop: 1427    Procedure: LEFT ACHILLES REPAIR 2 DEGREE     LEFT CALCANEAL SPUR RESECTION          LEFT FHL TENDON TRANSFER       LEFT GASTROC RECESSION   - [address] (Left Ankle) Diagnosis: (DX CALCANEAL SPUR LEFT)    Surgeons: Darren Lei DPM Responsible Provider: Marcia Deras DO    Anesthesia Type: general ASA Status: 2          Anesthesia Type: general    Marko Phase I: Marko Score: 10    Marko Phase II:      Last vitals: Reviewed and per EMR flowsheets.        Anesthesia Post Evaluation    Patient location during evaluation: PACU  Patient participation: complete - patient participated  Level of consciousness: awake and alert  Airway patency: patent  Nausea & Vomiting: no nausea and no vomiting  Complications: no  Cardiovascular status: hemodynamically stable  Respiratory status: acceptable  Hydration status: stable

## 2021-12-03 NOTE — ANESTHESIA PROCEDURE NOTES
Peripheral Block    Patient location during procedure: pre-op  Start time: 12/3/2021 11:07 AM  End time: 12/3/2021 11:12 AM  Staffing  Performed: anesthesiologist   Anesthesiologist: Ildefonso Carmichael DO  Preanesthetic Checklist  Completed: patient identified, IV checked, site marked, risks and benefits discussed, surgical consent, monitors and equipment checked, pre-op evaluation, timeout performed, anesthesia consent given, oxygen available and patient being monitored  Peripheral Block  Patient position: supine  Prep: ChloraPrep  Patient monitoring: cardiac monitor, continuous pulse ox, frequent blood pressure checks and IV access  Block type: Sciatic  Laterality: left  Injection technique: single-shot  Guidance: ultrasound guided  Local infiltration: lidocaine  Infiltration strength: 1 %  Dose: 3 mL  Popliteal  Provider prep: mask and sterile gloves  Local infiltration: lidocaine  Needle  Needle type: combined needle/nerve stimulator   Needle gauge: 21 G  Needle length: 10 cm  Needle localization: ultrasound guidance  Assessment  Injection assessment: negative aspiration for heme, no paresthesia on injection and local visualized surrounding nerve on ultrasound  Paresthesia pain: none  Slow fractionated injection: yes  Hemodynamics: stable  Additional Notes  Left popliteal single shot   10ml 1.3% exparel + 20ml 0.5% bupivacaine             Reason for block: post-op pain management and at surgeon's request

## 2021-12-03 NOTE — H&P
Interval H&P Note    Pt Name: Dereck Reina  MRN: [id number]  YOB: 1975  Date of evaluation: 12/3/2021      [x] I have reviewed in Epic the podiatry progress note by Dr. Sunny Avelar dated 11/9/2021 attached below which meets the criteria for an Interval History and Physical note. [x] I have examined  Dereck Reina  There are no changes to the patient who is scheduled for a left achilles repair 2 degree, left calcaneal spur resection, left FHL tendon transfer, left gastroc recession by Dr. Sunny Avelar for left calcaneal spur. The patient denies new health changes, fever, chills, wheezing, cough, increased SOB, chest pain, open sores or wounds. Denies hx diabetes. Denies taking any blood thinning medications in the last 10 days. Vital signs: /76   Pulse 59   Temp 97.1 °F (36.2 °C) (Temporal)   Resp 18   Ht 5' 10\" (1.778 m)   Wt (!) 315 lb (142.9 kg)   SpO2 96%   BMI 45.20 kg/m²     Allergies:  No known allergies    Medications:    Prior to Admission medications    Medication Sig Start Date End Date Taking? Authorizing Provider   Misc. Devices (FREE SPIRIT [employer]) MISC For use post operative only 11/15/21   Kierra Espino DPM         This is a 54 y.o. male who is pleasant, cooperative, alert and oriented x3, in no acute distress. Morbidly obese    Heart: Heart sounds are normal.  HR 60 regular rate and rhythm without murmur, gallop or rub. Lungs: Normal respiratory effort with equal expansion, good air exchange, unlabored and clear to auscultation without wheezes or rales bilaterally   Abdomen: soft, nontender, nondistended with bowel sounds active. Labs:  No results for input(s): HGB, HCT, WBC, MCV, PLT, NA, K, CL, CO2, BUN, CREATININE, GLUCOSE, INR, PROTIME, APTT, AST, ALT, LABALBU, HCG in the last 720 hours. No results for input(s): COVID19 in the last 720 hours.     MICHELINE Moseley - CNP  Electronically signed 12/3/2021 at 10:06 AM      Kierra Espino DPM Podiatrist   Specialty:  Podiatry   Progress Notes      Signed   Encounter Date:  11/9/2021         Related encounter: Office Visit from 11/9/2021 in [address] Collapse All          Show:Clear all  [x]Manual[x]Template[x]Copied    Added by:  [x]Bryson Plata DPM      []Leonides for details      [facility]  [id number] [place] [id number]  Dept: [phone]  Dept Fax: [phone]     RETURN PATIENT PROGRESS NOTE  Date of patient's visit: 11/9/2021  Patient's Name:  Anoop Mina  YOB: 1975             Patient Care Team:  MICHELINE Cristina CNP as PCP - General (Certified Nurse Practitioner)  Williams Benítez DPM as Physician (Podiatry)         Anoop Mina 54 y.o. male that presents for follow-up of        Chief Complaint   Patient presents with    Foot Pain       left    Follow-up       MRI review       Pt's primary care physician is MICHELINE Aguirre CNP last seen9/2/2021  Symptoms began 4 month(s) ago and are unchanged . Patient relates pain is present . Pain is rated 2 out of 10 and is described as intermittent. Treatments prior to today's visit include: CAM Boot. He has no pain when in the CAM boot but a lot of pain when he moves his ankle without the boot . Currently denies F/C/N/V. Allergies   Allergen Reactions    No Known Allergies           Past Medical History   No past medical history on file. Home Medications           Prior to Admission medications    Medication Sig Start Date End Date Taking?  Authorizing Provider   predniSONE (DELTASONE) 10 MG tablet Take one PO TID x 3 days  Take one PO BID x 3 days  Take one PO q daily x 3 days  Take 1/2 po q daily x 4 days  Patient not taking: Reported on 11/9/2021 1/26/21     Williams Benítez DPM   diclofenac (VOLTAREN) 75 MG EC tablet Take 1 tablet by mouth 2 times daily (with meals) for 14 days 6/26/18 color 1-5 bilateral.  Fissures absent, Bilateral.      Left ankle MRI:     Reviewed      Impression   1. Prior repair of the distal Achilles tendon with anchors in place. Moderate to severe distal Achilles tendinosis at its insertion on the   calcaneus with well corticated ossicles seen within the distal portion of the   tendon. No discrete tear of the Achilles identified. No retrocalcaneal   bursitis. 2. Mild tenosynovitis of the intact medial and lateral tendons. Underlying   moderate to severe tendinosis of the distal posterior tibialis tendon. 3. Mild hindfoot and midfoot osteoarthritis. Asessment: Patient is a 54 y.o. male with:     Diagnosis Orders   1. Left foot pain      2. Gastrocnemius equinus, left      3. Pain of left heel               Plan: Patient examined and evaluated. Current condition and treatment options discussed in detail. Advised pt to his condtion and reviewed the MRI with the patient . Verbal and written instructions given to patient. Contact office with any questions/problems/concerns. All labs were reviewed and all imagining including the above findings were reviewed prior to the patients arrival and with the patient today      Time was spent educating the patient and their families/caregivers on proper care of the feet and ankles. All the above diagnosis were addressed at todays visit and all questions were answered. I had a long discussion today with the patient about the likely diagnosis and its natural history, physical exam and imaging findings, as well as treatment options. We discussed both surgical and nonsurgical treatments, including risks and benefits. From a nonoperative standpoint, we discussed rest/activity modification, NSAIDs/Acetaminophen/topical anesthetics, orthotics, bracing/immobilization, and physical therapy.       Surgically, we discussed gastroc recession, achilles repair, calcaneal spur resection and FHL tendon transfer possibly         I discussed in detail the procedure. He/She was in full agreement and understood risks. The reason for surgery is due to unsuccessful non-operative treatment and/or conservative treatment is not a viable option. It was discussed with the patient that compliance postoperatively is of utmost importance. Any deviation on behalf of the patient will decrease the chances of a successful outcome. Patient acknowledged, understands, and would like to move forward with surgery as discussed. The patient was given a consent outlining the general risk of surgery as well as the specifics to the surgical plan. This was carefully discussed giving all options, indications and contraindications regarding the procedure outlined in the consent. All questions were answered to the patient's satisfaction. The patient signed the consent form confirming complete understanding and acceptance of the risks of stated. I specifically stated and inquired if the patient understands and accepts the risks of surgery including infection, failure, prolonged pain, swelling, numbness, recurrence, limited mobility,painful scar, RSDS, overcorrection, under-correction, and loss of limb/life. Death, bleeding, blood clots in the veins or lungs, tendon or blood vessel disturbance, bony conditions, continued pain,stiffness, weakness, and limited function. These were all listed on the consent. Additionally, the postoperative course and treatment was outlined for the patient. Discussion consisted of postoperatively the patient needs to keep the foot elevated for at least the first initial two weeks. I have encouraged movements such as moving from the bed to the sofa or recliner, to the kitchen and the bathroom; quick bursts of movement with the foot elevated. Longstanding periods of time such as cooking, cleaning, and shopping are not permitted. I reminded the patient that there are only two reasons to have surgery.  That being, if their function is impaired and also if they are having pain. If they can answer yes to both these questions, I will move forward with surgery. If they can not, there is no reason to proceed with surgical intervention. Pt does elect to have the procedure above     A total of 35 minutes was spent with this patients encounter        No orders of the defined types were placed in this encounter. Encounter Medications    No orders of the defined types were placed in this encounter.           RTC in 1 week(s) post op .    11/9/2021        Electronically signed by Rashida Carmona DPM on 11/9/2021 at 2:31 PM  11/9/2021

## 2021-12-03 NOTE — ANESTHESIA PRE PROCEDURE
Department of Anesthesiology  Preprocedure Note       Name:  Gita Kaye   Age:  54 y.o.  :  1975                                          MRN:  [id number]         Date:  12/3/2021      Surgeon: Carley Yip):  Maryann Ceron DPM    Procedure: Procedure(s):  LEFT ACHILLES REPAIR 2 DEGREE     LEFT CALCANEAL SPUR RESECTION          LEFT FHL TENDON TRANSFER       LEFT GASTROC RECESSION   - [address]    Medications prior to admission:   Prior to Admission medications    Medication Sig Start Date End Date Taking? Authorizing Provider   Misc. Devices (FREE SPIRIT KNEE/LEG WALKER) MISC For use post operative only 11/15/21   Maryann Ceron DPM       Current medications:    Current Facility-Administered Medications   Medication Dose Route Frequency Provider Last Rate Last Admin    0.9 % sodium chloride infusion   IntraVENous Continuous Thong Cunningham, DO        lactated ringers infusion   IntraVENous Continuous Mic Coleman,  mL/hr at 21 1027 New Bag at 21 1027    sodium chloride flush 0.9 % injection 10 mL  10 mL IntraVENous 2 times per day Thong Cunningham, DO        sodium chloride flush 0.9 % injection 10 mL  10 mL IntraVENous PRN Mic Coleman, DO        0.9 % sodium chloride infusion  25 mL IntraVENous PRN Mic Coleman, DO        lidocaine PF 1 % injection 1 mL  1 mL IntraDERmal Once PRN Thong Cunningham, DO        ceFAZolin (ANCEF) 3,000 mg in dextrose 5 % 100 mL IVPB  3,000 mg IntraVENous Once Dipti Ny DPM           Allergies: Allergies   Allergen Reactions    No Known Allergies        Problem List:    Patient Active Problem List   Diagnosis Code    Nodule of tendon sheath M67.90    Obesity E66.9    Chronic right shoulder pain M25.511, G89.29       Past Medical History:  History reviewed. No pertinent past medical history.     Past Surgical History:        Procedure Laterality Date    ANKLE SURGERY      left    GASTRIC BYPASS SURGERY LABABO, LABRH    Drug/Infectious Status (If Applicable):  No results found for: HIV, HEPCAB    COVID-19 Screening (If Applicable): No results found for: COVID19        Anesthesia Evaluation  Patient summary reviewed and Nursing notes reviewed no history of anesthetic complications:   Airway: Mallampati: II  TM distance: >3 FB   Neck ROM: full  Mouth opening: > = 3 FB Dental: normal exam         Pulmonary:normal exam        (-) COPD and asthma                           Cardiovascular:  Exercise tolerance: good (>4 METS),       (-) past MI, CAD and CABG/stent        Rate: normal                    Neuro/Psych:               GI/Hepatic/Renal:        (-) GERD       Endo/Other:        (-) diabetes mellitus               Abdominal:             Vascular: Other Findings:             Anesthesia Plan      general     ASA 2       Induction: intravenous. MIPS: Prophylactic antiemetics administered. Anesthetic plan and risks discussed with patient. Plan discussed with CRNA.     Attending anesthesiologist reviewed and agrees with Preprocedure content            Daphne Reina DO   12/3/2021

## 2021-12-09 ENCOUNTER — OFFICE VISIT (OUTPATIENT)
Dept: PODIATRY | Age: 46
End: 2021-12-09

## 2021-12-09 VITALS — BODY MASS INDEX: 45.1 KG/M2 | HEIGHT: 70 IN | RESPIRATION RATE: 18 BRPM | WEIGHT: 315 LBS

## 2021-12-09 DIAGNOSIS — M79.672 LEFT FOOT PAIN: ICD-10-CM

## 2021-12-09 DIAGNOSIS — Z98.890 POSTOPERATIVE STATE: Primary | ICD-10-CM

## 2021-12-09 PROCEDURE — 99024 POSTOP FOLLOW-UP VISIT: CPT | Performed by: PODIATRIST

## 2021-12-09 NOTE — PROGRESS NOTES
[facility] PHYSICIANS  [facility]  [address] [place] [address]  Dept: [phone]  Dept Fax: [phone]    POST-OP PROGRESS NOTE  Date of patient's visit: 12/9/2021  Patient's Name:  Josefina Mae YOB: 1975            Patient Care Team:  MICHELINE Baxter CNP as PCP - General (Certified Nurse Practitioner)  Landry Toribio DPM as Physician (Podiatry)        Chief Complaint   Patient presents with    Post-Op Check    Foot Pain       Pt's primary care physician is MICHELINE Baxter CNP last seen 9/2/2021     Subjective: Josefina Mae is a 54 y.o. male who presents to the office today 1week(s)  S/P left foot achilles repair w FHL tendon transfer and calc spur resection  for correction of   Problem List Items Addressed This Visit     None      Visit Diagnoses     Postoperative state    -  Primary    Left foot pain          . Patient relates pain is Present and improved. Pain is rated 2 out of 10 and is described as constant, mild. Currently denies F/C/N/V. Is patient taking pain medications as prescribed and is controlling pain    Physical Examination:  Incision is coapted, sutures/steri-strips are intact. Minimal bleeding post operatively. Edema present. No erythema. No Pus. Operative correction is satisfactory. R      Assessment: Josefina Mae is status post as above  Normal post operative course. Doing well          ICD-10-CM    1. Postoperative state  Z98.890    2. Left foot pain  M79.672          Plan:  Patient examined and evaluated. Current condition and treatment options discussed in detail. Advised pt to his condition. New dsd aplied to the patient today. Pt put back in posterior splint. Verbal and written instructions given to patient. Orders: No orders of the defined types were placed in this encounter. Contact office with any questions/problems/concerns.   RTC in 1week(s) with suture removal .     Electronically signed

## 2021-12-21 ENCOUNTER — OFFICE VISIT (OUTPATIENT)
Dept: PODIATRY | Age: 46
End: 2021-12-21

## 2021-12-21 VITALS — BODY MASS INDEX: 44.1 KG/M2 | WEIGHT: 315 LBS | RESPIRATION RATE: 18 BRPM | HEIGHT: 71 IN

## 2021-12-21 DIAGNOSIS — Z98.890 POSTOPERATIVE STATE: Primary | ICD-10-CM

## 2021-12-21 DIAGNOSIS — M79.672 LEFT FOOT PAIN: ICD-10-CM

## 2021-12-21 PROCEDURE — 99024 POSTOP FOLLOW-UP VISIT: CPT | Performed by: PODIATRIST

## 2021-12-21 NOTE — PROGRESS NOTES
[facility] PHYSICIANS  [facility]  [address] [place] [address]  Dept: [phone]  Dept Fax: [phone]    POST-OP PROGRESS NOTE  Date of patient's visit: 12/21/2021  Patient's Name:  Juan Mayen YOB: 1975            Patient Care Team:  MICHELINE Burgess CNP as PCP - General (Certified Nurse Practitioner)  Marco Sr DPM as Physician (Podiatry)        Chief Complaint   Patient presents with    Post-Op Check     2 wk post op    Foot Pain       Pt's primary care physician is MICHELINE Snyder CNP last seen 9/2/2021     Subjective: Juan Mayen is a 54 y.o. male who presents to the office today 2week(s)  S/P achilles tendon repair FHL tendon transfer and gastroc recession with heel spur resection for correction of achilles tear and pain  Problem List Items Addressed This Visit     None      Visit Diagnoses     Postoperative state    -  Primary    Left foot pain          . Patient relates pain is Present and imrpved. Pain is rated 2 out of 10 and is described as mild. Currently denies F/C/N/V. Is patient taking pain medications as prescribed and is controlling pain    Physical Examination:  Incision is coapted, sutures/steri-strips are intact. Minimal bleeding post operatively. Edema present. No erythema. No Pus. Operative correction is satisfactory. Assessment: Juan Mayen is status post as above  Normal post operative course. Doing well          ICD-10-CM    1. Postoperative state  Z98.890    2. Left foot pain  M79.672          Plan:  Patient examined and evaluated. Current condition and treatment options discussed in detail. Advised pt to his conditoni. Patient presents for suture removal. The wound is well healed without signs of infection. The sutures are removed. Wound care and activity instructions given. Pt to stay in CAM boot and can weightbear in cam boot as needed.   Verbal and written instructions given to patient. Orders: No orders of the defined types were placed in this encounter. Contact office with any questions/problems/concerns. RTC in 2week(s) and then can becan ankle strengtheing exercises .      Electronically signed by Faith Magallanes DPM on 12/21/2021 at 11:23 AM  12/21/2021

## 2022-01-04 ENCOUNTER — OFFICE VISIT (OUTPATIENT)
Dept: PODIATRY | Age: 47
End: 2022-01-04

## 2022-01-04 VITALS — WEIGHT: 315 LBS | HEIGHT: 71 IN | RESPIRATION RATE: 18 BRPM | BODY MASS INDEX: 44.1 KG/M2

## 2022-01-04 DIAGNOSIS — Z98.890 POSTOPERATIVE STATE: Primary | ICD-10-CM

## 2022-01-04 PROCEDURE — 99024 POSTOP FOLLOW-UP VISIT: CPT | Performed by: PODIATRIST

## 2022-01-04 NOTE — PROGRESS NOTES
[address] [facility] PODIATRY [facility]  [address] [address] [address]  Dept: [phone]  Dept Fax: [phone]    POST-OP PROGRESS NOTE  Date of patient's visit: 1/4/2022  Patient's Name:  Kuldip Ambriz YOB: 1975            Patient Care Team:  MICHELINE Vick CNP as PCP - General (Certified Nurse Practitioner)  Angelo García DPM as Physician (Podiatry)        Chief Complaint   Patient presents with    Post-Op Check     4 wk post op    Foot Pain       Pt's primary care physician is MICHELINE Umanzor CNP last seen 9/2/2021     Subjective: Kuldip Ambriz is a 51 y.o. male who presents to the office today 4week(s)  S/P FHL tendon transfer and achilles repair with calcaneal heel spur resection  for correction of torn achilles   Problem List Items Addressed This Visit     None      Visit Diagnoses     Postoperative state    -  Primary      . Patient relates pain is Present and imporved. Pain is rated 3 out of 10 and is described as intermittent, mild. Currently denies F/C/N/V. Is patient taking pain medications as prescribed and is controlling pain  He has been in the CAm boot and coming out of the boot to walk     Physical Examination:  Incision is coapted, sutures/steri-strips are intact. Minimal bleeding post operatively. Edema present. No erythema. No Pus. Operative correction is satisfactory. 5/5 muscle strength to all muscle groups        Assessment: Kuldip Ambriz is status post as above  Normal post operative course. Doing well          ICD-10-CM    1. Postoperative state  Z98.890          Plan:  Patient examined and evaluated. Current condition and treatment options discussed in detail. Advised pt to start working on ROM exercises and ankle strengtehning . Verbal and written instructions given to patient. Orders: No orders of the defined types were placed in this encounter.      Contact office with any questions/problems/concerns. RTC in 4week(s)      Non-Weight Bearing Dorsiflexion     Ankle dorsiflexion is the motion of bending your ankle up towards your shin. Gaining this motion can help you regain the ability to walk normally again. Here's how to get more ankle dorsiflexion:   1. Moving only your ankle, point your foot back toward your nose (while keeping knees straight). Continue until you feel discomfort or can't tilt it back any further. 2. Hold this position for 15 seconds. 3. Return to neutral position and repeat 5 times. Non-Weight Bearing Plantar Flexion     Plantar flexion is the motion of pointing your ankle down and away from you. Here is how to gain ankle plantarflexion range of motion (ROM):   1. Moving only your ankle, point your foot forward (while keeping knees straight). Continue until you feel discomfort or can't move it any further. 2. Hold this position for 15 seconds. 3. Return to neutral position. Non-Weight Bearing Inversion     Inversion refers to the motion of pointing your ankle inwards towards the midline of your body. Here is how you gain more ankle inversion:   1. Moving only your ankle and keeping your toes pointed up, turn your foot inward, so the sole is facing your other leg. Continue until either discomfort is felt or you can no longer turn your foot inward. 2. Hold this position for 15 seconds. 3. Return to neutral position. Non-Weight Bearing Eversion     Eversion is the motion of moving your ankle to the outside or lateral part of your leg. Perform this exercise to gain eversion motion in your ankle:   1. Moving only your ankle and keeping your toes pointed up, turn your foot outward, away from your other leg. Continue until either discomfort is felt or you can no longer turn your foot outward. 2. Hold this position for 15 seconds. 3. Return to neutral position.   The Alphabet   A great way physical therapists help their patients gain ankle mobility in all directions is to perform the ankle alphabet. This can get your ankle moving in all directions. Here is how to do the exercise:   1. Sit on a chair with your foot dangling in the air or on a bed with your foot hanging off the edge. 2. Draw the alphabet one letter at a time by moving the injured ankle and using the great toe as your \"pencil. \"  Eversion Isometrics     Strengthening exercises are usually started with isometric contractions--no motion occurs around your ankle joint during the muscle contraction. They may be done early after injury or surgery to start to gently--and safely--add force to the muscles that support your ankle. To do the exericse:   1. While seated, place the outside of the injured foot against a table leg or closed door. 2. Push outward with your foot into the object your foot is against (your ankle joint should not move) causing a contraction of your muscles. 3. Hold this muscle contraction for 15 seconds. 4. Relax for 10 seconds. Inversion Isometrics     This isometric exercise focuses on inversion:   1. While seated, place the inside of the injured foot against a table leg or closed door. 2. Push inward with your foot into the object your foot is against (your ankle joint should not move) causing a contraction of your muscles. 3. Hold this muscle contraction for 15 seconds. 4. Relax for 10 seconds. Resisted Strengthening Dorsiflexion     Resisted strengthening exercises should be performed with a Theraband providing resistance to your movements. These exercises will also work to strengthen the muscles around your ankle. This will provide added support to the joint. Perform each exercise 10 to 15 times in a row. Never tie a Theraband (or anything else) around your foot, ankle, or leg in a way that would restrict blood flow. Ankle dorsiflexion with resistance helps to strengthen your anterior tibialis muscle. Here is how you do it:   1.  Moving only your ankle, point your foot back toward your nose (while keeping knees straight). Continue until you feel discomfort or can't tilt it back any further. 2. Hold this position for 2 seconds and slowly release. 3. Return to the neutral position, and then repeat the exercise. Resisted Strengthening Plantar Flexion     Resisted ankle plantar flexion helps to strengthen your calf muscles and Achilles tendon. To do the exercise:   1. Moving only your ankle, point your foot forward (while keeping knees straight). You may feel tightness in your calf muscle behind your lower leg. Continue until you feel discomfort or can't move it any further. 2. Hold this position for 2 seconds. 3. Return to neutral position. Resisted Strengthening Inversion     This exercise will provide strengthening as well:   1. Moving only your ankle and keeping your toes pointed up, turn your foot inward, so the sole is facing your other leg. Continue until either discomfort is felt or you can no longer turn your foot inward. 2. Hold this position for 2 seconds. 3. Return to neutral position. Resisted Strengthening Eversion     Now strengthen in the other direction:   1. Moving only your ankle and keeping your toes pointed up, turn your foot outward, away from your other leg. Continue until either discomfort is felt or you can no longer turn your foot outward. 2. Hold this position for 2 seconds. 3. Return to neutral position. Partial Weight-Bearing Seated Calf Raises     These partial weight-bearing exercises will help put more weight on the injured ankle as well as strengthen the muscles around it. Each one should be performed 10 times in a row:   1. Sit in a chair with the injured foot on the floor. 2. Lift your heel as far as possible while keeping your toes on the floor. 3. Return heel to the floor.   Partial Weight-Bearing Standing Weight Shift     Sometimes after injury, your doctor will have you limit the amount of weight you can put through your lower extremity. This can help protect it as things are healing. As you heal, your PT may guide you in increasing weight bearing through your injured ankle. Weight shifts are the perfect exercise to do for this. To do the exercise:   1. Stand upright while holding onto a stable object. 2. Shift some of your weight onto the injured foot. 3. Hold the position for 15 seconds. 4. Relax and put your weight back onto your uninjured foot. Full Weight-Bearing Standing Calf Raises     Once you are cleared for full weight bearing, you may do these calf raises:   1. Stand on the injured foot while lifting the uninjured foot off the ground. 2. Raise up, standing only on the ball of the injured foot and lifting your heel off the ground. 3. Hold the position for 15 seconds. 4. Relax and put your weight back onto your uninjured foot.        Electronically signed by Vikas Stevens DPM on 1/4/2022 at 11:00 AM  1/4/2022

## 2022-02-01 ENCOUNTER — OFFICE VISIT (OUTPATIENT)
Dept: PODIATRY | Age: 47
End: 2022-02-01

## 2022-02-01 VITALS — BODY MASS INDEX: 44.1 KG/M2 | HEIGHT: 71 IN | WEIGHT: 315 LBS | RESPIRATION RATE: 16 BRPM

## 2022-02-01 DIAGNOSIS — Z98.890 POSTOPERATIVE STATE: Primary | ICD-10-CM

## 2022-02-01 PROCEDURE — 99024 POSTOP FOLLOW-UP VISIT: CPT | Performed by: PODIATRIST

## 2022-02-01 NOTE — PROGRESS NOTES
[address] [facility] PODIATRY [facility]  [address] [address] [address]  Dept: [phone]  Dept Fax: [phone]    POST-OP PROGRESS NOTE  Date of patient's visit: 2/1/2022  Patient's Name:  Kimberlyn Hillman YOB: 1975            Patient Care Team:  MICHELINE Stratton CNP as PCP - General (Certified Nurse Practitioner)  Onelia Mazariegos DPM as Physician (Podiatry)        Chief Complaint   Patient presents with    Post-Op Check     8 wk post op       Pt's primary care physician is MICHELINE Mitchell CNP last seen 9/2/2021     Subjective: Kimberlyn Hillman is a 51 y.o. male who presents to the office today 8week(s)  S/P Achilles tendon repair, gastroc recession , and FHL tendon transfer with calcaneal spur resectoin  for correction of chronic achilles tear  Problem List Items Addressed This Visit     None      Visit Diagnoses     Postoperative state    -  Primary      . Patient relates pain is Present and improved. Pain is rated 1 out of 10 and is described as mild. Currently denies F/C/N/V. Is patient taking pain medications as prescribed and is controlling pain    Physical Examination:  Incision is coapted, sutures/steri-strips are intact. Minimal bleeding post operatively. Edema present. No erythema. No Pus. Operative correction is satisfactory. Radiographs: left foot 3 views:  Intact hardware to the left foot and calcaneus with no ossification of the achilles  Tendon seen      Assessment: Kimberlyn Hillman is status post as above  Normal post operative course. Doing well          ICD-10-CM    1. Postoperative state  Z98.890          Plan:  Patient examined and evaluated. Current condition and treatment options discussed in detail. Advised pt to weight bear as tolerated with no restricttions. Non-Weight Bearing Dorsiflexion     Ankle dorsiflexion is the motion of bending your ankle up towards your shin.  Gaining this motion can help you regain the ability to walk normally again. Here's how to get more ankle dorsiflexion:   1. Moving only your ankle, point your foot back toward your nose (while keeping knees straight). Continue until you feel discomfort or can't tilt it back any further. 2. Hold this position for 15 seconds. 3. Return to neutral position and repeat 5 times. Non-Weight Bearing Plantar Flexion     Plantar flexion is the motion of pointing your ankle down and away from you. Here is how to gain ankle plantarflexion range of motion (ROM):   1. Moving only your ankle, point your foot forward (while keeping knees straight). Continue until you feel discomfort or can't move it any further. 2. Hold this position for 15 seconds. 3. Return to neutral position. Non-Weight Bearing Inversion     Inversion refers to the motion of pointing your ankle inwards towards the midline of your body. Here is how you gain more ankle inversion:   1. Moving only your ankle and keeping your toes pointed up, turn your foot inward, so the sole is facing your other leg. Continue until either discomfort is felt or you can no longer turn your foot inward. 2. Hold this position for 15 seconds. 3. Return to neutral position. Non-Weight Bearing Eversion     Eversion is the motion of moving your ankle to the outside or lateral part of your leg. Perform this exercise to gain eversion motion in your ankle:   1. Moving only your ankle and keeping your toes pointed up, turn your foot outward, away from your other leg. Continue until either discomfort is felt or you can no longer turn your foot outward. 2. Hold this position for 15 seconds. 3. Return to neutral position. The Alphabet   A great way physical therapists help their patients gain ankle mobility in all directions is to perform the ankle alphabet. This can get your ankle moving in all directions. Here is how to do the exercise:   1.  Sit on a chair with your foot dangling in the air or on a bed with your foot hanging off the edge. 2. Draw the alphabet one letter at a time by moving the injured ankle and using the great toe as your \"pencil. \"  Eversion Isometrics     Strengthening exercises are usually started with isometric contractions--no motion occurs around your ankle joint during the muscle contraction. They may be done early after injury or surgery to start to gently--and safely--add force to the muscles that support your ankle. To do the exericse:   1. While seated, place the outside of the injured foot against a table leg or closed door. 2. Push outward with your foot into the object your foot is against (your ankle joint should not move) causing a contraction of your muscles. 3. Hold this muscle contraction for 15 seconds. 4. Relax for 10 seconds. Inversion Isometrics     This isometric exercise focuses on inversion:   1. While seated, place the inside of the injured foot against a table leg or closed door. 2. Push inward with your foot into the object your foot is against (your ankle joint should not move) causing a contraction of your muscles. 3. Hold this muscle contraction for 15 seconds. 4. Relax for 10 seconds. Resisted Strengthening Dorsiflexion     Resisted strengthening exercises should be performed with a Theraband providing resistance to your movements. These exercises will also work to strengthen the muscles around your ankle. This will provide added support to the joint. Perform each exercise 10 to 15 times in a row. Never tie a Theraband (or anything else) around your foot, ankle, or leg in a way that would restrict blood flow. Ankle dorsiflexion with resistance helps to strengthen your anterior tibialis muscle. Here is how you do it:   1. Moving only your ankle, point your foot back toward your nose (while keeping knees straight). Continue until you feel discomfort or can't tilt it back any further.   2. Hold this position for 2 seconds and slowly release. 3. Return to the neutral position, and then repeat the exercise. Resisted Strengthening Plantar Flexion     Resisted ankle plantar flexion helps to strengthen your calf muscles and Achilles tendon. To do the exercise:   1. Moving only your ankle, point your foot forward (while keeping knees straight). You may feel tightness in your calf muscle behind your lower leg. Continue until you feel discomfort or can't move it any further. 2. Hold this position for 2 seconds. 3. Return to neutral position. Resisted Strengthening Inversion     This exercise will provide strengthening as well:   1. Moving only your ankle and keeping your toes pointed up, turn your foot inward, so the sole is facing your other leg. Continue until either discomfort is felt or you can no longer turn your foot inward. 2. Hold this position for 2 seconds. 3. Return to neutral position. Resisted Strengthening Eversion     Now strengthen in the other direction:   1. Moving only your ankle and keeping your toes pointed up, turn your foot outward, away from your other leg. Continue until either discomfort is felt or you can no longer turn your foot outward. 2. Hold this position for 2 seconds. 3. Return to neutral position. Partial Weight-Bearing Seated Calf Raises     These partial weight-bearing exercises will help put more weight on the injured ankle as well as strengthen the muscles around it. Each one should be performed 10 times in a row:   1. Sit in a chair with the injured foot on the floor. 2. Lift your heel as far as possible while keeping your toes on the floor. 3. Return heel to the floor. Partial Weight-Bearing Standing Weight Shift     Sometimes after injury, your doctor will have you limit the amount of weight you can put through your lower extremity. This can help protect it as things are healing. As you heal, your PT may guide you in increasing weight bearing through your injured ankle.  Weight shifts are the perfect exercise to do for this. To do the exercise:   1. Stand upright while holding onto a stable object. 2. Shift some of your weight onto the injured foot. 3. Hold the position for 15 seconds. 4. Relax and put your weight back onto your uninjured foot. Full Weight-Bearing Single Leg Stance     These exercises will help put more weight on the injured foot. You should be sure that your ankle can tolerate the pressure that you are putting upon it. Perform each one 10 times in a row:   1. Stand on the injured foot while lifting the uninjured foot off the ground. 2. Hold the position for 15 seconds. 3. Relax and put your weight back onto your uninjured foot. Checking in with your PT may be necessary to be sure you are doing the right exercises for your ankle. Full Weight-Bearing Standing Calf Raises     Once you are cleared for full weight bearing, you may do these calf raises:   1. Stand on the injured foot while lifting the uninjured foot off the ground. 2. Raise up, standing only on the ball of the injured foot and lifting your heel off the ground. 3. Hold the position for 15 seconds. 4. Relax and put your weight back onto your uninjured foot. Full Weight-Bearing Lateral Stepping     Increase the speed of this exercise as your healing progresses:   1. Place a rolled towel or short object on the ground to the side of your injured foot. 2. Step over the towel with the injured foot and remain on that foot. 3. Then bring the uninjured foot over the object and stand on both feet. 4. Step back over the towel with the uninjured foot and remain on that foot. 5. Then bring the injured foot back over the towel and stand on both feet. Full Weight-Bearing Lateral Jump     This exercise starts to incorporate plyometrics into your rehab routine, which can help you get back to running and sports. Increase the speed of this exercise as your healing progresses:   1.  Place a rolled towel or short object on the ground to the side of your injured foot. 2. Hop over the towel and land on the injured foot. 3. Then hop back over the towel and land on the uninjured foot. Single Leg Stance on a Towel     Injury to ankles can often result in decreased balance ability. 2? Towards the end of rehabilitation, performing balance activities is an important way to prevent future injury. Perform this exercise 10 times in a row:   1. Fold a towel into a small rectangle and place on the ground. 2. Stand with the injured foot on the towel. 3. Lift the uninjured leg off the ground standing only on the towel with the injured leg. 4. Hold for 15 seconds. (As balance improves, increase stance time on injured leg up to 45 seconds.)  5. Return your uninjured foot to the floor. You can increase the challenge by standing on more unsteady surfaces like a BOSU or wobble board. Your PT may also have you use a BAPS board while working on balance exercises. .  Verbal and written instructions given to patient. Orders: No orders of the defined types were placed in this encounter. Contact office with any questions/problems/concerns. RTC in PRN.      Electronically signed by Ramila Morales DPM on 2/1/2022 at 8:02 AM  2/1/2022

## 2022-04-19 ENCOUNTER — OFFICE VISIT (OUTPATIENT)
Dept: FAMILY MEDICINE CLINIC | Age: 47
End: 2022-04-19
Payer: COMMERCIAL

## 2022-04-19 VITALS
TEMPERATURE: 98.5 F | HEIGHT: 70 IN | BODY MASS INDEX: 44.67 KG/M2 | HEART RATE: 80 BPM | WEIGHT: 312 LBS | OXYGEN SATURATION: 99 % | SYSTOLIC BLOOD PRESSURE: 106 MMHG | DIASTOLIC BLOOD PRESSURE: 64 MMHG

## 2022-04-19 DIAGNOSIS — M25.561 CHRONIC PAIN OF RIGHT KNEE: ICD-10-CM

## 2022-04-19 DIAGNOSIS — G89.29 CHRONIC RIGHT SHOULDER PAIN: ICD-10-CM

## 2022-04-19 DIAGNOSIS — G89.29 CHRONIC PAIN OF RIGHT KNEE: ICD-10-CM

## 2022-04-19 DIAGNOSIS — Z98.84 HISTORY OF ROUX-EN-Y GASTRIC BYPASS: ICD-10-CM

## 2022-04-19 DIAGNOSIS — M25.511 CHRONIC RIGHT SHOULDER PAIN: ICD-10-CM

## 2022-04-19 DIAGNOSIS — S09.90XD CHI (CLOSED HEAD INJURY), SUBSEQUENT ENCOUNTER: Primary | ICD-10-CM

## 2022-04-19 DIAGNOSIS — Z13.29 SCREENING FOR THYROID DISORDER: ICD-10-CM

## 2022-04-19 DIAGNOSIS — Z13.220 SCREENING, LIPID: ICD-10-CM

## 2022-04-19 DIAGNOSIS — Z12.5 ENCOUNTER FOR SCREENING FOR MALIGNANT NEOPLASM OF PROSTATE: ICD-10-CM

## 2022-04-19 PROCEDURE — 99203 OFFICE O/P NEW LOW 30 MIN: CPT | Performed by: INTERNAL MEDICINE

## 2022-04-19 RX ORDER — CELECOXIB 100 MG/1
100 CAPSULE ORAL DAILY
Qty: 30 CAPSULE | Refills: 3 | Status: SHIPPED | OUTPATIENT
Start: 2022-04-19 | End: 2022-08-15

## 2022-04-19 SDOH — ECONOMIC STABILITY: FOOD INSECURITY: WITHIN THE PAST 12 MONTHS, THE FOOD YOU BOUGHT JUST DIDN'T LAST AND YOU DIDN'T HAVE MONEY TO GET MORE.: NEVER TRUE

## 2022-04-19 SDOH — ECONOMIC STABILITY: FOOD INSECURITY: WITHIN THE PAST 12 MONTHS, YOU WORRIED THAT YOUR FOOD WOULD RUN OUT BEFORE YOU GOT MONEY TO BUY MORE.: NEVER TRUE

## 2022-04-19 ASSESSMENT — PATIENT HEALTH QUESTIONNAIRE - PHQ9
1. LITTLE INTEREST OR PLEASURE IN DOING THINGS: 1
5. POOR APPETITE OR OVEREATING: 0
8. MOVING OR SPEAKING SO SLOWLY THAT OTHER PEOPLE COULD HAVE NOTICED. OR THE OPPOSITE, BEING SO FIGETY OR RESTLESS THAT YOU HAVE BEEN MOVING AROUND A LOT MORE THAN USUAL: 2
6. FEELING BAD ABOUT YOURSELF - OR THAT YOU ARE A FAILURE OR HAVE LET YOURSELF OR YOUR FAMILY DOWN: 3
10. IF YOU CHECKED OFF ANY PROBLEMS, HOW DIFFICULT HAVE THESE PROBLEMS MADE IT FOR YOU TO DO YOUR WORK, TAKE CARE OF THINGS AT HOME, OR GET ALONG WITH OTHER PEOPLE: 1
SUM OF ALL RESPONSES TO PHQ QUESTIONS 1-9: 13
3. TROUBLE FALLING OR STAYING ASLEEP: 0
SUM OF ALL RESPONSES TO PHQ9 QUESTIONS 1 & 2: 3
SUM OF ALL RESPONSES TO PHQ QUESTIONS 1-9: 13
2. FEELING DOWN, DEPRESSED OR HOPELESS: 2
SUM OF ALL RESPONSES TO PHQ QUESTIONS 1-9: 13
7. TROUBLE CONCENTRATING ON THINGS, SUCH AS READING THE NEWSPAPER OR WATCHING TELEVISION: 2
4. FEELING TIRED OR HAVING LITTLE ENERGY: 3
9. THOUGHTS THAT YOU WOULD BE BETTER OFF DEAD, OR OF HURTING YOURSELF: 0
SUM OF ALL RESPONSES TO PHQ QUESTIONS 1-9: 13

## 2022-04-19 ASSESSMENT — SOCIAL DETERMINANTS OF HEALTH (SDOH): HOW HARD IS IT FOR YOU TO PAY FOR THE VERY BASICS LIKE FOOD, HOUSING, MEDICAL CARE, AND HEATING?: NOT VERY HARD

## 2022-04-19 NOTE — PROGRESS NOTES
Subjective:       Patient ID:     Lance Mathews is a 51 y.o. male who presents for   Chief Complaint   Patient presents with    Knee Pain     RT    Established New Doctor       HPI:  Nursing note reviewed and discussed with patient. HPI  Right knee pain - 2 weeks, worse with stairs, going down worse than going up. No injuries, no trauma or swelling. Woke up one morning and was coming down when his leg nearly gave out coming downstairs. R shoulder pain - ongoing for a decade now, hurts all the time but now hurts to lay on the side, difficulty lifting anything towards the side. Has difficulty holding up the coffeepot for more than a minute. Pain started after shoveling snow. He was initially told he had tendonitis, since then he has had MRI at [facility] in 1/2021. Cortisone injection to shoulder in 2019 made it worse. Topical icy-hot helps sometimes, NSAIDs have not really helped the pain at all. Wife was concerned about him getting a therapist to eval for chronic traumatic encephalopathy. H/o repeated head injuries in the past because he used to be a medieval reenactor, a friend that did this with him recently passed and was diagnosed at autopsy. Has difficulty focusing on one job, has some memory difficulties. Positive depression screen - due to physical limitations. Sometimes feels depressed. No SI, HI, auditory or visual hallucination. Patient's medications, allergies, past medical, surgical, social and family histories were reviewed and updated as appropriate.     Past Medical History:   Diagnosis Date    Knee pain, right     Shoulder pain, right      Past Surgical History:   Procedure Laterality Date    ACHILLES TENDON SURGERY Left 12/3/2021    LEFT ACHILLES REPAIR 2 DEGREE     LEFT CALCANEAL SPUR RESECTION          LEFT FHL TENDON TRANSFER       LEFT GASTROC RECESSION   - ARTHREX    DEBORA performed by Rafia Dumas DPM at [address]      left    GASTRIC BYPASS SURGERY  2014    nellie en y    HAND SURGERY Left        Social History     Tobacco Use    Smoking status: Never Smoker    Smokeless tobacco: Never Used   Substance Use Topics    Alcohol use: Yes     Comment: occ      Patient Active Problem List   Diagnosis    Nodule of tendon sheath    Obesity    Chronic right shoulder pain    Contracture of muscle of left lower extremity    Acute post-operative pain    Left leg pain    Calcific tendonitis of foot, left    Contracture of left ankle    Calcaneal spur of left foot    Heel pain, chronic, left         Prior to Visit Medications    Not on File     Review of Systems  Review of Systems   Constitutional: Negative for fatigue, fever and unexpected weight change. Respiratory: Negative for cough, choking, chest tightness, shortness of breath and wheezing. Cardiovascular: Negative for chest pain, palpitations and leg swelling. Gastrointestinal: Negative for abdominal pain, anal bleeding, blood in stool, constipation, diarrhea, nausea and vomiting. Endocrine: Negative. Musculoskeletal: Positive for arthralgias, gait problem and joint swelling. Negative for myalgias. Skin: Negative. Neurological: Negative for dizziness. Psychiatric/Behavioral: Negative for sleep disturbance. All other systems reviewed and are negative. Objective:       Physical Exam:  /64   Pulse 80   Temp 98.5 °F (36.9 °C) (Temporal)   Ht 5' 9.69\" (1.77 m)   Wt (!) 312 lb (141.5 kg)   SpO2 99%   BMI 45.17 kg/m²   Wt Readings from Last 3 Encounters:   04/19/22 (!) 312 lb (141.5 kg)   02/01/22 (!) 315 lb (142.9 kg)   01/04/22 (!) 315 lb (142.9 kg)         Physical Exam  Vitals and nursing note reviewed. Constitutional:       General: He is not in acute distress. Appearance: He is well-developed. He is obese. He is not ill-appearing. Eyes:      General: Lids are normal. Vision grossly intact. Cardiovascular:      Rate and Rhythm: Normal rate and regular rhythm.       Heart sounds: Normal heart sounds, S1 normal and S2 normal. No murmur heard. No friction rub. No gallop. Pulmonary:      Effort: Pulmonary effort is normal. No respiratory distress. Breath sounds: Normal breath sounds. No wheezing. Abdominal:      General: Bowel sounds are normal.      Palpations: Abdomen is soft. There is no mass. Tenderness: There is no abdominal tenderness. There is no guarding. Musculoskeletal:         General: Normal range of motion. Right knee: Crepitus present. Left knee: Crepitus present. Skin:     General: Skin is warm and dry. Capillary Refill: Capillary refill takes less than 2 seconds. Neurological:      General: No focal deficit present. Mental Status: He is alert and oriented to person, place, and time. Data Review  No visits with results within 2 Month(s) from this visit. Latest known visit with results is:   Office Visit on 01/02/2020   Component Date Value    Strep A Ag 01/02/2020 None Detected      Lab Results   Component Value Date    CHOL 180 09/05/2017    TRIG 114 09/05/2017    HDL 57 09/05/2017          Assessment/Plan:      1. CHI (closed head injury), subsequent encounter  - Pan Oneill, PhD, Neuropsychology, [facility]    2. History of Stephanie-en-Y gastric bypass  - CBC with Auto Differential; Future  - Vitamin D 25 Hydroxy; Future  - Hemoglobin A1C; Future  - Vitamin B12 & Folate; Future  - Vitamin A; Future  - Ferritin; Future  - Iron and TIBC; Future    3. Chronic pain of right knee  - celecoxib (CELEBREX) 100 MG capsule; Take 1 capsule by mouth daily  Dispense: 30 capsule; Refill: 3    4. Chronic right shoulder pain  - celecoxib (CELEBREX) 100 MG capsule; Take 1 capsule by mouth daily  Dispense: 30 capsule; Refill: 3    5. Screening, lipid  - Comprehensive Metabolic Panel; Future  - Lipid Panel; Future    6. Screening for thyroid disorder  - TSH with Reflex; Future    7.  Encounter for screening for malignant neoplasm of prostate  - PSA Screening;  Future           Health Maintenance Due   Topic Date Due    Diabetes screen  Never done    Colorectal Cancer Screen  Never done       Electronically signed by Segundo Horta MD on 4/19/2022 at 2:44 PM

## 2022-04-19 NOTE — PROGRESS NOTES
Pt is here to establish  He is having bad RT knee pain hurts more when standing, walking up and down stairs. He is also having RT shoulder pain for past 11 years.   His wife when like him tested for CTE due to a lot of hits in his head over the  past 20 years

## 2022-04-19 NOTE — PATIENT INSTRUCTIONS
Patient Education      Patient Education        Patellar Tendinitis (Jumper's Knee): Exercises  Introduction  Here are some examples of exercises for you to try. The exercises may be suggested for a condition or for rehabilitation. Start each exercise slowly. Ease off the exercises if you start to have pain. You will be told when to start these exercises and which ones will work bestfor you. How to do the exercises  Straight-leg raises to the front    1. Lie on your back with your good knee bent so that your foot rests flat on the floor. Your affected leg should be straight. Make sure that your low back has a normal curve. You should be able to slip your hand in between the floor and the small of your back, with your palm touching the floor and your back touching the back of your hand. 2. Tighten the thigh muscles in your affected leg by pressing the back of your knee flat down to the floor. Hold your knee straight. 3. Keeping the thigh muscles tight and your leg straight, lift your leg up so that your heel is about 12 inches off the floor. 4. Hold for about 6 seconds, then lower your leg slowly. Rest for up to 10 seconds between repetitions. 5. Repeat 8 to 12 times. Short-arc quad    1. Lie on your back with your knees bent over a foam roll or large rolled-up towel and your heels on the floor. 2. Lift the lower part of your affected leg until your leg is straight. Keep the back of your knee on the foam roll or towel. 3. Hold your leg straight for about 6 seconds, then slowly bend your knee and lower your heel back to the floor. Rest for up to 10 seconds between repetitions. 4. Repeat 8 to 12 times. Half-squat with knees and feet turned out to the side    1. Stand with your feet about shoulder-width apart and turned out to the side about 45 degrees. 2. Keep your back straight, and tighten your buttocks. 3. Slowly bend your knees to lower your body about one-quarter of the way down toward the floor.  Try to keep your back straight at all times, and do not let your pelvis tilt forward or your knees extend beyond the tip of your toes. 4. Repeat 8 to 12 times. Step up    1. Place a single-step footstool on the floor. If you do not have a footstool, you can use a thick book, such as a phone book, a dictionary, or an encyclopedia. If you are not steady on your feet, hold on to a chair, counter, or wall while you do this exercise. 2. Keeping your back straight, step up with your affected leg. Try not to push off your back leg as you step up. Only use your affected leg to bring you up on to the step. Then lift your other leg on to the step. As you step up, try to keep your knee moving in a straight line with your middle toe. 3. Move back to the starting position, with both feet on the floor. 4. Repeat 8 to 12 times. Step down    1. Stand on a single-step footstool. If you do not have a footstool, you can use a thick book, such as a phone book, a dictionary, or an encyclopedia. If you are not steady on your feet, hold on to a chair, counter, or wall while you do this exercise. 2. Slowly step down with your good leg, allowing your heel to lightly touch the floor. As you step down, try to keep your affected knee moving in a straight line toward your middle toe. 3. Move back to the starting position, with both feet on the footstool or book. 4. Repeat 8 to 12 times. Terminal knee extension    1. Tie the ends of an exercise band together to form a loop. Attach one end of the loop to a secure object, or shut a door on it to hold it in place. (Or you can have someone hold one end of the loop to provide resistance.)  2. Loop the other end of the exercise band around the knee of your affected leg. Keep that leg somewhat bent at the knee. 3. Put your good leg about a step behind your affected leg. Then slowly straighten your affected leg by tightening the thigh muscles of that leg.   4. Hold for about 6 seconds, then return to the starting position, with your knee somewhat bent. 5. Rest for up to 10 seconds. 6. Repeat 8 to 12 times. Follow-up care is a key part of your treatment and safety. Be sure to make and go to all appointments, and call your doctor if you are having problems. It's also a good idea to know your test results and keep alist of the medicines you take. Where can you learn more? Go to [email or web address] org and sign in to your damntheradio account. Enter M922 in the [facility] box to learn more about \"Patellar Tendinitis (Jumper's Knee): Exercises. \"     If you do not have an account, please click on the \"Sign Up Now\" link. Current as of: July 1, 2021               Content Version: 13.2  © 2006-2022 Healthwise, [employer]. Care instructions adapted under license by [facility] ([facility]). If you have questions about a medical condition or this instruction, always ask your healthcare professional. [place] [address] any warranty or liability for your use of this information. Knee (Pes Anserine) Bursitis: Exercises  Introduction  Here are some examples of exercises for you to try. The exercises may be suggested for a condition or for rehabilitation. Start each exercise slowly. Ease off the exercises if you start to have pain. You will be told when to start these exercises and which ones will work bestfor you. How to do the exercises  Heel slide    1. Lie on your back with your affected knee straight. Your good knee should be bent. 2. Bend your affected knee by sliding your heel across the floor and toward your buttock until you feel a gentle stretch in your knee. 3. Hold for about 6 seconds, and then slowly straighten your knee. 4. Repeat 8 to 12 times. Quad sets    1. Sit with your affected leg straight and supported on the floor or a firm bed. Place a small, rolled-up towel under your affected knee.  Your other leg should be bent, with that foot flat on the floor.  2. Tighten the thigh muscles of your affected leg by pressing the back of your knee down into the towel. 3. Hold for about 6 seconds, then rest for up to 10 seconds. 4. Repeat 8 to 12 times. Straight-leg raises to the front    1. Lie on your back with your good knee bent so that your foot rests flat on the floor. Your affected leg should be straight. Make sure that your low back has a normal curve. You should be able to slip your hand in between the floor and the small of your back, with your palm touching the floor and your back touching the back of your hand. 2. Tighten the thigh muscles in your affected leg by pressing the back of your knee flat down to the floor. Hold your knee straight. 3. Keeping the thigh muscles tight and your leg straight, lift your affected leg up so that your heel is about 12 inches off the floor. 4. Hold for about 6 seconds, then lower slowly. Rest for up to 10 seconds between repetitions. 5. Repeat 8 to 12 times. Follow-up care is a key part of your treatment and safety. Be sure to make and go to all appointments, and call your doctor if you are having problems. It's also a good idea to know your test results and keep alist of the medicines you take. Where can you learn more? Go to https://[email or web address] [email or web address] org and sign in to your [employer] account. Enter Z761 in the [facility] box to learn more about \"Knee (Pes Anserine) Bursitis: Exercises. \"     If you do not have an account, please click on the \"Sign Up Now\" link. Current as of: July 1, 2021               Content Version: 13.2  © 2509-4179 Healthwise, Incorporated. Care instructions adapted under license by [facility] ([facility]). If you have questions about a medical condition or this instruction, always ask your healthcare professional. [address] any warranty or liability for your use of this information.

## 2022-04-23 ENCOUNTER — HOSPITAL ENCOUNTER (OUTPATIENT)
Age: 47
Discharge: HOME | End: 2022-04-23
Payer: COMMERCIAL

## 2022-04-23 DIAGNOSIS — Z12.5 ENCOUNTER FOR SCREENING FOR MALIGNANT NEOPLASM OF PROSTATE: ICD-10-CM

## 2022-04-23 DIAGNOSIS — Z13.220 SCREENING, LIPID: ICD-10-CM

## 2022-04-23 DIAGNOSIS — Z13.29 SCREENING FOR THYROID DISORDER: ICD-10-CM

## 2022-04-23 DIAGNOSIS — Z98.84 HISTORY OF ROUX-EN-Y GASTRIC BYPASS: ICD-10-CM

## 2022-04-23 LAB
ABSOLUTE EOS #: 0.08 K/UL (ref 0–0.44)
ABSOLUTE IMMATURE GRANULOCYTE: 0.03 K/UL (ref 0–0.3)
ABSOLUTE LYMPH #: 1.43 K/UL (ref 1.1–3.7)
ABSOLUTE MONO #: 0.5 K/UL (ref 0.1–1.2)
ALBUMIN SERPL-MCNC: 4.1 G/DL (ref 3.5–5.2)
ALBUMIN/GLOBULIN RATIO: 1.5 (ref 1–2.5)
ALP BLD-CCNC: 80 U/L (ref 40–129)
ALT SERPL-CCNC: 33 U/L (ref 5–41)
ANION GAP SERPL CALCULATED.3IONS-SCNC: 9 MMOL/L (ref 9–17)
AST SERPL-CCNC: 28 U/L
BASOPHILS # BLD: 0 % (ref 0–2)
BASOPHILS ABSOLUTE: <0.03 K/UL (ref 0–0.2)
BILIRUB SERPL-MCNC: 0.51 MG/DL (ref 0.3–1.2)
BUN BLDV-MCNC: 9 MG/DL (ref 6–20)
CALCIUM SERPL-MCNC: 9.1 MG/DL (ref 8.6–10.4)
CHLORIDE BLD-SCNC: 108 MMOL/L (ref 98–107)
CHOLESTEROL/HDL RATIO: 3
CHOLESTEROL: 163 MG/DL
CO2: 23 MMOL/L (ref 20–31)
CREAT SERPL-MCNC: 0.87 MG/DL (ref 0.7–1.2)
EOSINOPHILS RELATIVE PERCENT: 1 % (ref 1–4)
FERRITIN: 24 NG/ML (ref 30–400)
FOLATE: 12.9 NG/ML
GFR AFRICAN AMERICAN: >60 ML/MIN
GFR NON-AFRICAN AMERICAN: >60 ML/MIN
GFR SERPL CREATININE-BSD FRML MDRD: ABNORMAL ML/MIN/{1.73_M2}
GLUCOSE BLD-MCNC: 110 MG/DL (ref 70–99)
HCT VFR BLD CALC: 41.5 % (ref 40.7–50.3)
HDLC SERPL-MCNC: 54 MG/DL
HEMOGLOBIN: 13.5 G/DL (ref 13–17)
IMMATURE GRANULOCYTES: 1 %
IRON SATURATION: 26 % (ref 20–55)
IRON: 98 UG/DL (ref 59–158)
LDL CHOLESTEROL: 91 MG/DL (ref 0–130)
LYMPHOCYTES # BLD: 25 % (ref 24–43)
MCH RBC QN AUTO: 29.3 PG (ref 25.2–33.5)
MCHC RBC AUTO-ENTMCNC: 32.5 G/DL (ref 28.4–34.8)
MCV RBC AUTO: 90.2 FL (ref 82.6–102.9)
MONOCYTES # BLD: 9 % (ref 3–12)
NRBC AUTOMATED: 0 PER 100 WBC
PDW BLD-RTO: 14 % (ref 11.8–14.4)
PLATELET # BLD: 257 K/UL (ref 138–453)
PMV BLD AUTO: 10 FL (ref 8.1–13.5)
POTASSIUM SERPL-SCNC: 3.9 MMOL/L (ref 3.7–5.3)
PROSTATE SPECIFIC ANTIGEN: 1.05 NG/ML
RBC # BLD: 4.6 M/UL (ref 4.21–5.77)
SEG NEUTROPHILS: 64 % (ref 36–65)
SEGMENTED NEUTROPHILS ABSOLUTE COUNT: 3.75 K/UL (ref 1.5–8.1)
SODIUM BLD-SCNC: 140 MMOL/L (ref 135–144)
TOTAL IRON BINDING CAPACITY: 373 UG/DL (ref 250–450)
TOTAL PROTEIN: 6.8 G/DL (ref 6.4–8.3)
TRIGL SERPL-MCNC: 88 MG/DL
TSH SERPL DL<=0.05 MIU/L-ACNC: 2.44 UIU/ML (ref 0.3–5)
UNSATURATED IRON BINDING CAPACITY: 275 UG/DL (ref 112–347)
VITAMIN B-12: 398 PG/ML (ref 232–1245)
VITAMIN D 25-HYDROXY: 10.3 NG/ML
WBC # BLD: 5.8 K/UL (ref 3.5–11.3)

## 2022-04-23 PROCEDURE — 82746 ASSAY OF FOLIC ACID SERUM: CPT

## 2022-04-23 PROCEDURE — 80053 COMPREHEN METABOLIC PANEL: CPT

## 2022-04-23 PROCEDURE — 85025 COMPLETE CBC W/AUTO DIFF WBC: CPT

## 2022-04-23 PROCEDURE — 84443 ASSAY THYROID STIM HORMONE: CPT

## 2022-04-23 PROCEDURE — 82607 VITAMIN B-12: CPT

## 2022-04-23 PROCEDURE — 83540 ASSAY OF IRON: CPT

## 2022-04-23 PROCEDURE — G0103 PSA SCREENING: HCPCS

## 2022-04-23 PROCEDURE — 83036 HEMOGLOBIN GLYCOSYLATED A1C: CPT

## 2022-04-23 PROCEDURE — 83550 IRON BINDING TEST: CPT

## 2022-04-23 PROCEDURE — 80061 LIPID PANEL: CPT

## 2022-04-23 PROCEDURE — 82728 ASSAY OF FERRITIN: CPT

## 2022-04-23 PROCEDURE — 36415 COLL VENOUS BLD VENIPUNCTURE: CPT

## 2022-04-23 PROCEDURE — 82306 VITAMIN D 25 HYDROXY: CPT

## 2022-04-23 PROCEDURE — 84590 ASSAY OF VITAMIN A: CPT

## 2022-04-24 LAB
ESTIMATED AVERAGE GLUCOSE: 117 MG/DL
HBA1C MFR BLD: 5.7 % (ref 4–6)

## 2022-04-26 LAB
RETINYL PALMITATE: <0.02 MG/L (ref 0–0.1)
VITAMIN A LEVEL: 0.43 MG/L (ref 0.3–1.2)
VITAMIN A, INTERP: NORMAL

## 2022-05-07 ASSESSMENT — ENCOUNTER SYMPTOMS
BLOOD IN STOOL: 0
VOMITING: 0
COUGH: 0
WHEEZING: 0
NAUSEA: 0
SHORTNESS OF BREATH: 0
DIARRHEA: 0
CONSTIPATION: 0
CHOKING: 0
ANAL BLEEDING: 0
ABDOMINAL PAIN: 0
CHEST TIGHTNESS: 0

## 2022-05-07 ASSESSMENT — VISUAL ACUITY: OU: 1

## 2022-05-25 ENCOUNTER — OFFICE VISIT (OUTPATIENT)
Dept: FAMILY MEDICINE CLINIC | Age: 47
End: 2022-05-25
Payer: COMMERCIAL

## 2022-05-25 VITALS
HEART RATE: 60 BPM | WEIGHT: 301.2 LBS | DIASTOLIC BLOOD PRESSURE: 64 MMHG | SYSTOLIC BLOOD PRESSURE: 106 MMHG | BODY MASS INDEX: 43.61 KG/M2 | OXYGEN SATURATION: 98 % | TEMPERATURE: 98.2 F

## 2022-05-25 DIAGNOSIS — G89.29 CHRONIC RIGHT SHOULDER PAIN: ICD-10-CM

## 2022-05-25 DIAGNOSIS — E55.9 VITAMIN D DEFICIENCY: Primary | ICD-10-CM

## 2022-05-25 DIAGNOSIS — M25.511 CHRONIC RIGHT SHOULDER PAIN: ICD-10-CM

## 2022-05-25 DIAGNOSIS — G89.29 CHRONIC PAIN OF RIGHT KNEE: ICD-10-CM

## 2022-05-25 DIAGNOSIS — Z98.84 HISTORY OF ROUX-EN-Y GASTRIC BYPASS: ICD-10-CM

## 2022-05-25 DIAGNOSIS — M25.561 CHRONIC PAIN OF RIGHT KNEE: ICD-10-CM

## 2022-05-25 PROCEDURE — 99214 OFFICE O/P EST MOD 30 MIN: CPT | Performed by: INTERNAL MEDICINE

## 2022-05-25 RX ORDER — ERGOCALCIFEROL 1.25 MG/1
50000 CAPSULE ORAL WEEKLY
Qty: 12 CAPSULE | Refills: 1 | Status: SHIPPED | OUTPATIENT
Start: 2022-05-25

## 2022-05-25 ASSESSMENT — ENCOUNTER SYMPTOMS
ANAL BLEEDING: 0
ABDOMINAL PAIN: 0
NAUSEA: 0
CHOKING: 0
CONSTIPATION: 0
DIARRHEA: 0
WHEEZING: 0
VOMITING: 0
COUGH: 0
CHEST TIGHTNESS: 0
SHORTNESS OF BREATH: 0
BLOOD IN STOOL: 0

## 2022-05-25 ASSESSMENT — VISUAL ACUITY: OU: 1

## 2022-05-25 NOTE — PROGRESS NOTES
Subjective:       Patient ID:     Jo Malagon is a 51 y.o. male who presents for   Chief Complaint   Patient presents with    Knee Pain     better    Shoulder Pain     RT   not better       HPI:  Nursing note reviewed and discussed with patient. Here for follow-up today   Knees are much better, doing very well with celebrex and home PT. Able to go up and down stairs and walk around without any issue. No abdominal pain, heartburn with the celebrex. No dysphagia, melena, hematochezia, constipation, diarrhea   Started MVI since LV   Shoulders are still the same - R shoulder mainly. Has been daignosed in the past with Impingement Syndrome of Shoulder Region; Biceps Tendinitis; Arthritis of Acromioclavicular Joint - MRI done 1/2021 [facility]         Patient's medications, allergies, past medical, surgical, social and family histories were reviewed and updated as appropriate.     Past Medical History:   Diagnosis Date    Knee pain, right     Shoulder pain, right      Past Surgical History:   Procedure Laterality Date    ACHILLES TENDON SURGERY Left 12/3/2021    LEFT ACHILLES REPAIR 2 DEGREE     LEFT CALCANEAL SPUR RESECTION          LEFT FHL TENDON TRANSFER       LEFT GASTROC RECESSION   - ARTHREX    DEBORA performed by Nabila Castillo DPM at [address] GASTRIC BYPASS SURGERY  2014    nellie en y    HAND SURGERY Left        Social History     Tobacco Use    Smoking status: Never Smoker    Smokeless tobacco: Never Used   Substance Use Topics    Alcohol use: Yes     Comment: occ      Patient Active Problem List   Diagnosis    Nodule of tendon sheath    Obesity    Chronic right shoulder pain    Contracture of muscle of left lower extremity    Acute post-operative pain    Left leg pain    Calcific tendonitis of foot, left    Contracture of left ankle    Calcaneal spur of left foot    Heel pain, chronic, left    History of Nellie-en-Y gastric bypass         Prior to Visit Medications    Medication Sig Taking? Authorizing Provider   celecoxib (CELEBREX) 100 MG capsule Take 1 capsule by mouth daily Yes Olaf Peters MD     Review of Systems  Review of Systems   Constitutional: Negative for fatigue, fever and unexpected weight change. Respiratory: Negative for cough, choking, chest tightness, shortness of breath and wheezing. Cardiovascular: Negative for chest pain, palpitations and leg swelling. Gastrointestinal: Negative for abdominal pain, anal bleeding, blood in stool, constipation, diarrhea, nausea and vomiting. Endocrine: Negative. Musculoskeletal: Positive for arthralgias. Negative for joint swelling and myalgias. Skin: Negative. Neurological: Negative for dizziness. Psychiatric/Behavioral: Negative for sleep disturbance. All other systems reviewed and are negative. Objective:       Physical Exam:  /64   Pulse 60   Temp 98.2 °F (36.8 °C) (Temporal)   Wt (!) 301 lb 3.2 oz (136.6 kg)   SpO2 98%   BMI 43.61 kg/m²   Wt Readings from Last 3 Encounters:   05/25/22 (!) 301 lb 3.2 oz (136.6 kg)   04/19/22 (!) 312 lb (141.5 kg)   02/01/22 (!) 315 lb (142.9 kg)         Physical Exam  Vitals and nursing note reviewed. Constitutional:       General: He is not in acute distress. Appearance: He is well-developed. He is not ill-appearing. Eyes:      General: Lids are normal. Vision grossly intact. Cardiovascular:      Rate and Rhythm: Normal rate and regular rhythm. Heart sounds: Normal heart sounds, S1 normal and S2 normal. No murmur heard. No friction rub. No gallop. Pulmonary:      Effort: Pulmonary effort is normal. No respiratory distress. Breath sounds: Normal breath sounds. No wheezing. Abdominal:      General: Bowel sounds are normal.      Palpations: Abdomen is soft. There is no mass. Tenderness: There is no abdominal tenderness. There is no guarding. Musculoskeletal:         General: Normal range of motion. Skin:     General: Skin is warm and dry. Capillary Refill: Capillary refill takes less than 2 seconds. Neurological:      General: No focal deficit present. Mental Status: He is alert and oriented to person, place, and time.          Data Review  Hospital Outpatient Visit on 04/23/2022   Component Date Value    Iron 04/23/2022 98     TIBC 04/23/2022 373     Iron Saturation 04/23/2022 26     UIBC 04/23/2022 275     Ferritin 04/23/2022 24*    Vitamin A 04/23/2022 0.43     RETINYL PALMITATE 04/23/2022 <0.02     Vitamin A, Interp 04/23/2022 Normal     Vitamin B-12 04/23/2022 398     Folate 04/23/2022 12.9     Hemoglobin A1C 04/23/2022 5.7     Estimated Avg Glucose 04/23/2022 117     PSA 04/23/2022 1.05     Vit D, 25-Hydroxy 04/23/2022 10.3*    TSH 04/23/2022 2.44     Cholesterol 04/23/2022 163     HDL 04/23/2022 54     LDL Cholesterol 04/23/2022 91     Chol/HDL Ratio 04/23/2022 3.0     Triglycerides 04/23/2022 88     Glucose 04/23/2022 110*    BUN 04/23/2022 9     CREATININE 04/23/2022 0.87     Calcium 04/23/2022 9.1     Sodium 04/23/2022 140     Potassium 04/23/2022 3.9     Chloride 04/23/2022 108*    CO2 04/23/2022 23     Anion Gap 04/23/2022 9     Alkaline Phosphatase 04/23/2022 80     ALT 04/23/2022 33     AST 04/23/2022 28     Total Bilirubin 04/23/2022 0.51     Total Protein 04/23/2022 6.8     Albumin 04/23/2022 4.1     Albumin/Globulin Ratio 04/23/2022 1.5     GFR Non- 04/23/2022 >60     GFR  04/23/2022 >60     GFR Comment 04/23/2022          WBC 04/23/2022 5.8     RBC 04/23/2022 4.60     Hemoglobin 04/23/2022 13.5     Hematocrit 04/23/2022 41.5     MCV 04/23/2022 90.2     MCH 04/23/2022 29.3     MCHC 04/23/2022 32.5     RDW 04/23/2022 14.0     Platelets 38/25/1772 257     MPV 04/23/2022 10.0     NRBC Automated 04/23/2022 0.0     Seg Neutrophils 04/23/2022 64     Lymphocytes 04/23/2022 25     Monocytes 04/23/2022 9  Eosinophils % 04/23/2022 1     Basophils 04/23/2022 0     Immature Granulocytes 04/23/2022 1*    Segs Absolute 04/23/2022 3.75     Absolute Lymph # 04/23/2022 1.43     Absolute Mono # 04/23/2022 0.50     Absolute Eos # 04/23/2022 0.08     Basophils Absolute 04/23/2022 <0.03     Absolute Immature Granul* 04/23/2022 0.03      Lab Results   Component Value Date    CHOL 163 04/23/2022    TRIG 88 04/23/2022    HDL 54 04/23/2022          Assessment/Plan:      1. Vitamin D deficiency  - vitamin D (ERGOCALCIFEROL) 1.25 MG (50795 UT) CAPS capsule; Take 1 capsule by mouth once a week  Dispense: 12 capsule; Refill: 1    2. History of Stephanie-en-Y gastric bypass  Continue daily MVI     3. Chronic pain of right knee  Continue celebrex     4.  Chronic right shoulder pain  Baseline - will get [facility] including [facility] results   Pt agreeable to waiting for specialist referral since he is not interested in surgery and has seen multiple specialists for this            Health Maintenance Due   Topic Date Due    Colorectal Cancer Screen  Never done       Electronically signed by Cameron Hernandez MD on 5/25/2022 at 5:00 PM

## 2022-05-25 NOTE — PROGRESS NOTES
Pt is here for a F/U on shoulder and knee pain. He states knee is better, Celebrex helped) but shoulder is not and medication did not help.

## 2022-08-15 DIAGNOSIS — M25.561 CHRONIC PAIN OF RIGHT KNEE: ICD-10-CM

## 2022-08-15 DIAGNOSIS — G89.29 CHRONIC RIGHT SHOULDER PAIN: ICD-10-CM

## 2022-08-15 DIAGNOSIS — M25.511 CHRONIC RIGHT SHOULDER PAIN: ICD-10-CM

## 2022-08-15 DIAGNOSIS — G89.29 CHRONIC PAIN OF RIGHT KNEE: ICD-10-CM

## 2022-08-15 RX ORDER — CELECOXIB 100 MG/1
CAPSULE ORAL
Qty: 90 CAPSULE | Refills: 1 | Status: SHIPPED | OUTPATIENT
Start: 2022-08-15

## 2022-08-15 NOTE — TELEPHONE ENCOUNTER
Last visit: 05/25/2022  Last Med refill: 07/18/2022  Does patient have enough medication for 72 hours: No:     Next Visit Date:  Future Appointments   Date Time Provider Isaac Durham   9/27/2022  4:15 PM Millie De La Rosa  [address] Maintenance   Topic Date Due    Colorectal Cancer Screen  Never done    Flu vaccine (1) 09/01/2022    Depression Monitoring  04/19/2023    A1C test (Diabetic or Prediabetic)  04/23/2023    Lipids  04/23/2027    DTaP/Tdap/Td vaccine (3 - Td or Tdap) 09/28/2029    COVID-19 Vaccine  Completed    Hepatitis A vaccine  Aged Out    Hepatitis B vaccine  Aged Out    Hib vaccine  Aged Out    Meningococcal (ACWY) vaccine  Aged Out    Pneumococcal 0-64 years Vaccine  Aged Out    Hepatitis C screen  Discontinued    HIV screen  Discontinued       Hemoglobin A1C (%)   Date Value   04/23/2022 5.7             ( goal A1C is < 7)   No results found for: LABMICR  LDL Cholesterol (mg/dL)   Date Value   04/23/2022 91   09/05/2017 100       (goal LDL is <100)   AST (U/L)   Date Value   04/23/2022 28     ALT (U/L)   Date Value   04/23/2022 33     BUN (mg/dL)   Date Value   04/23/2022 9     BP Readings from Last 3 Encounters:   05/25/22 106/64   04/19/22 106/64   12/03/21 (!) 105/56          (goal 120/80)    All Future Testing planned in [facility]              Patient Active Problem List:     Nodule of tendon sheath     Obesity     Chronic right shoulder pain     Contracture of muscle of left lower extremity     Acute post-operative pain     Left leg pain     Calcific tendonitis of foot, left     Contracture of left ankle     Calcaneal spur of left foot     Heel pain, chronic, left     History of Stephanie-en-Y gastric bypass

## 2023-01-07 NOTE — PATIENT INSTRUCTIONS
Schedule a Vaccine  When you qualify to receive the vaccine, call the [facility]) COVID-19 Vaccination Hotline to schedule your appointment or to get additional information about the [facility]) locations which are offering the COVID-19 vaccine. To be 94% effective, it's important that you receive two doses of one of the COVID-19 vaccines. -If you are receiving the Rosa Peter vaccine, your second shot will be scheduled as close to 21 days after the first shot as possible. -If you are receiving the Moderna vaccine, your second shot will be scheduled as close to 28 days after the first shot as possible. [facility]) COVID-19 Vaccination Hotline: [phone]    Links to [facility]) website and [facility] website:    [employer] [facility] Valve/[facility]-[email or web address]    [email or web address] Patient baseline mental status

## 2023-01-26 ENCOUNTER — OFFICE VISIT (OUTPATIENT)
Dept: FAMILY MEDICINE CLINIC | Age: 48
End: 2023-01-26
Payer: COMMERCIAL

## 2023-01-26 VITALS
WEIGHT: 300.8 LBS | DIASTOLIC BLOOD PRESSURE: 70 MMHG | OXYGEN SATURATION: 97 % | HEART RATE: 65 BPM | SYSTOLIC BLOOD PRESSURE: 118 MMHG | BODY MASS INDEX: 43.55 KG/M2 | TEMPERATURE: 98.3 F

## 2023-01-26 DIAGNOSIS — E66.01 CLASS 3 SEVERE OBESITY WITH SERIOUS COMORBIDITY AND BODY MASS INDEX (BMI) OF 40.0 TO 44.9 IN ADULT, UNSPECIFIED OBESITY TYPE (HCC): ICD-10-CM

## 2023-01-26 DIAGNOSIS — Z12.11 COLON CANCER SCREENING: ICD-10-CM

## 2023-01-26 DIAGNOSIS — E55.9 VITAMIN D DEFICIENCY: ICD-10-CM

## 2023-01-26 DIAGNOSIS — G89.29 CHRONIC PAIN OF RIGHT KNEE: ICD-10-CM

## 2023-01-26 DIAGNOSIS — R06.00 DYSPNEA, UNSPECIFIED TYPE: Primary | ICD-10-CM

## 2023-01-26 DIAGNOSIS — Z98.84 HISTORY OF ROUX-EN-Y GASTRIC BYPASS: ICD-10-CM

## 2023-01-26 DIAGNOSIS — Z86.16 HISTORY OF COVID-19: ICD-10-CM

## 2023-01-26 DIAGNOSIS — G89.29 CHRONIC RIGHT SHOULDER PAIN: ICD-10-CM

## 2023-01-26 DIAGNOSIS — M25.561 CHRONIC PAIN OF RIGHT KNEE: ICD-10-CM

## 2023-01-26 DIAGNOSIS — M25.511 CHRONIC RIGHT SHOULDER PAIN: ICD-10-CM

## 2023-01-26 PROBLEM — E66.813 CLASS 3 SEVERE OBESITY WITH SERIOUS COMORBIDITY AND BODY MASS INDEX (BMI) OF 40.0 TO 44.9 IN ADULT: Status: ACTIVE | Noted: 2017-08-24

## 2023-01-26 PROCEDURE — 99214 OFFICE O/P EST MOD 30 MIN: CPT | Performed by: INTERNAL MEDICINE

## 2023-01-26 RX ORDER — ALBUTEROL SULFATE 90 UG/1
2 AEROSOL, METERED RESPIRATORY (INHALATION) 4 TIMES DAILY PRN
Qty: 18 G | Refills: 0 | Status: SHIPPED | OUTPATIENT
Start: 2023-01-26

## 2023-01-26 RX ORDER — ERGOCALCIFEROL 1.25 MG/1
50000 CAPSULE ORAL WEEKLY
Qty: 12 CAPSULE | Refills: 1 | Status: SHIPPED | OUTPATIENT
Start: 2023-01-26

## 2023-01-26 RX ORDER — CELECOXIB 100 MG/1
CAPSULE ORAL
Qty: 90 CAPSULE | Refills: 1 | Status: SHIPPED | OUTPATIENT
Start: 2023-01-26

## 2023-01-26 ASSESSMENT — PATIENT HEALTH QUESTIONNAIRE - PHQ9
SUM OF ALL RESPONSES TO PHQ QUESTIONS 1-9: 0
2. FEELING DOWN, DEPRESSED OR HOPELESS: 0
SUM OF ALL RESPONSES TO PHQ QUESTIONS 1-9: 0
SUM OF ALL RESPONSES TO PHQ9 QUESTIONS 1 & 2: 0
SUM OF ALL RESPONSES TO PHQ QUESTIONS 1-9: 0
1. LITTLE INTEREST OR PLEASURE IN DOING THINGS: 0
SUM OF ALL RESPONSES TO PHQ QUESTIONS 1-9: 0

## 2023-01-26 ASSESSMENT — ENCOUNTER SYMPTOMS
WHEEZING: 0
SHORTNESS OF BREATH: 0
CONSTIPATION: 0
ANAL BLEEDING: 0
COUGH: 0
CHEST TIGHTNESS: 0
NAUSEA: 0
BLOOD IN STOOL: 0
VOMITING: 0
DIARRHEA: 0
ABDOMINAL PAIN: 0
CHOKING: 0

## 2023-01-26 ASSESSMENT — VISUAL ACUITY: OU: 1

## 2023-01-26 NOTE — PROGRESS NOTES
Pt still having RT shoulder pain, pain is about the same, pain radiates thru the shoulder blade up to the neck     Pt had COVID 09/2022 ever since then he has been having SOB, works at the [employer] walking up a flight of stairs he is short of breath

## 2023-01-26 NOTE — PROGRESS NOTES
[facility]  [facility] [address]  [address] [id number]  Dept: [phone]  Dept Fax: [phone]      Toni Wolf is a 49 y.o. male who presents today for hismedical conditions/complaints as noted below. Toni Wolf is c/o of Shoulder Pain (F/u RT shoulder )        Assessment/Plan:     1. Dyspnea, unspecified type  -     albuterol sulfate HFA (VENTOLIN HFA) 108 (90 Base) MCG/ACT inhaler; Inhale 2 puffs into the lungs 4 times daily as needed for Wheezing or Shortness of Breath, Disp-18 g, R-0Normal  2. History of COVID-19  3. 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 Chronic right shoulder pain  -     celecoxib (CELEBREX) 100 MG capsule; TAKE 1 CAPSULE BY MOUTH EVERY DAY, Disp-90 capsule, R-1Normal  -     Jonah Blank MD, Orthopedic Surgery, [place]  5. Class 3 severe obesity with serious comorbidity and body mass index (BMI) of 40.0 to 44.9 in adult, unspecified obesity type ([address]  6. Colon cancer screening  -     FIT-DNA (Cologuard)  7. Chronic pain of right knee  -     celecoxib (CELEBREX) 100 MG capsule; TAKE 1 CAPSULE BY MOUTH EVERY DAY, Disp-90 capsule, R-1Normal  8. Vitamin D deficiency  -     vitamin D (ERGOCALCIFEROL) 1.25 MG (16596 UT) CAPS capsule; Take 1 capsule by mouth once a week, Disp-12 capsule, R-1Normal        No follow-ups on file. HPI     Had Rosa in September - tested positive on first day at his new job. Breathing is getting better - going up a flight of stairs was making him very short of breath. Slowly getting better, getting back on the treadmill. He is now working at the [employer]. Shoulder pain is about the same, no better and no worse. Knee pain well controlled with celebrex  Starting to get back on Keto diet now, as of two days ago. The whole family is doing it, going to [employer] in June so they are working towards being able to get on the [place] ride due to size limit.        BP Readings from Last 3 Encounters:   01/26/23 118/70   05/25/22 106/64   04/19/22 106/64              Past Medical History:   Diagnosis Date    Knee pain, right     Shoulder pain, right       Past Surgical History:   Procedure Laterality Date    ACHILLES TENDON SURGERY Left 12/3/2021    LEFT ACHILLES REPAIR 2 DEGREE     LEFT CALCANEAL SPUR RESECTION          LEFT FHL TENDON TRANSFER       LEFT GASTROC RECESSION   - ARTHREX    DEBORA performed by Daryl Joiner DPM at [address]      left    GASTRIC BYPASS SURGERY  2014    nellie en y    HAND SURGERY Left        Family History   Problem Relation Age of Onset    Diabetes Father     Other Father        Social History     Tobacco Use    Smoking status: Never    Smokeless tobacco: Never   Substance Use Topics    Alcohol use: Yes     Comment: occ        Allergies   Allergen Reactions    No Known Allergies      Prior to Visit Medications    Medication Sig Taking? Authorizing Provider   celecoxib (CELEBREX) 100 MG capsule TAKE 1 CAPSULE BY MOUTH EVERY DAY Yes Millie Lino MD   vitamin D (ERGOCALCIFEROL) 1.25 MG (83960 UT) CAPS capsule Take 1 capsule by mouth once a week Yes Felipe Montez MD       Review of Systems     Review of Systems   Constitutional:  Negative for fatigue, fever and unexpected weight change. Respiratory:  Negative for cough, choking, chest tightness, shortness of breath and wheezing. Cardiovascular:  Negative for chest pain, palpitations and leg swelling. Gastrointestinal:  Negative for abdominal pain, anal bleeding, blood in stool, constipation, diarrhea, nausea and vomiting. Endocrine: Negative. Musculoskeletal:  Negative for joint swelling and myalgias. Skin: Negative. Neurological:  Negative for dizziness. Psychiatric/Behavioral:  Negative for sleep disturbance. All other systems reviewed and are negative.     Objective     /70 (Site: Left Upper Arm, Position: Sitting, Cuff Size: Large Adult)   Pulse 65   Temp 98.3 °F (36.8 °C)   Wt (!) 300 lb 12.8 oz (136.4 kg)   SpO2 97%   BMI 43.55 kg/m²   Wt Readings from Last 3 Encounters:   01/26/23 (!) 300 lb 12.8 oz (136.4 kg)   05/25/22 (!) 301 lb 3.2 oz (136.6 kg)   04/19/22 (!) 312 lb (141.5 kg)       Physical Exam  Vitals and nursing note reviewed. Constitutional:       General: He is not in acute distress. Appearance: He is well-developed. He is not ill-appearing. Eyes:      General: Lids are normal. Vision grossly intact. Cardiovascular:      Rate and Rhythm: Normal rate and regular rhythm. Heart sounds: Normal heart sounds, S1 normal and S2 normal. No murmur heard. No friction rub. No gallop. Pulmonary:      Effort: Pulmonary effort is normal. No respiratory distress. Breath sounds: Normal breath sounds. No wheezing. Abdominal:      General: Bowel sounds are normal.      Palpations: Abdomen is soft. There is no mass. Tenderness: There is no abdominal tenderness. There is no guarding. Musculoskeletal:         General: Normal range of motion. Skin:     General: Skin is warm and dry. Capillary Refill: Capillary refill takes less than 2 seconds. Neurological:      General: No focal deficit present. Mental Status: He is alert and oriented to person, place, and time. Data Review     Labs in chart reviewed     Health Maintenance Due   Topic Date Due    Colorectal Cancer Screen  Never done           Patient given educational materials- see patient instructions. Discussed use, benefit, and side effects of prescribedmedications. All patient questions answered. Pt voiced understanding. Reviewedhealth maintenance. Instructed to continue current medications, diet and exercise. Patient agreed with treatment plan. Follow up as directed.      Electronically signedby Carroll Betancur MD on 1/26/2023

## 2023-02-09 ENCOUNTER — TELEPHONE (OUTPATIENT)
Dept: ORTHOPEDIC SURGERY | Age: 48
End: 2023-02-09

## 2023-02-09 NOTE — TELEPHONE ENCOUNTER
Patient called to report that Juany Collier will not fax over MRI report unless the office sends a cover sheet from Dr. Sargent Shoulder office  requesting it.  He has an appt scheduled on 2/24/2023 with Dr. Nay Triplett request can be faxed to [phone]

## 2023-02-09 NOTE — TELEPHONE ENCOUNTER
Called pt and advised that we do have his MRI report from 2021 but he was asked to get his images on a disc and bring to his appt. Pt voiced understanding.

## 2023-02-23 DIAGNOSIS — R06.00 DYSPNEA, UNSPECIFIED TYPE: ICD-10-CM

## 2023-02-23 RX ORDER — ALBUTEROL SULFATE 90 UG/1
2 AEROSOL, METERED RESPIRATORY (INHALATION) 4 TIMES DAILY PRN
Qty: 6.7 EACH | Refills: 3 | Status: SHIPPED | OUTPATIENT
Start: 2023-02-23

## 2023-02-23 NOTE — TELEPHONE ENCOUNTER
Last visit: 01/26/23  Last Med refill: 01/26/23  Does patient have enough medication for 72 hours: Yes    Next Visit Date:  Future Appointments   Date Time Provider Department Center   2/24/2023  8:30 AM Ratna Araujo MD [facility]   7/26/2023  4:45 PM [facility]       Health Maintenance   Topic Date Due    A1C test (Diabetic or Prediabetic)  04/23/2023    Depression Screen  01/26/2024    Colorectal Cancer Screen  02/10/2026    Lipids  04/23/2027    DTaP/Tdap/Td vaccine (3 - Td or Tdap) 09/28/2029    Flu vaccine  Completed    COVID-19 Vaccine  Completed    Hepatitis A vaccine  Aged Out    Hib vaccine  Aged Out    Meningococcal (ACWY) vaccine  Aged Out    Pneumococcal 0-64 years Vaccine  Aged Out    Hepatitis C screen  Discontinued    HIV screen  Discontinued       Hemoglobin A1C (%)   Date Value   04/23/2022 5.7             ( goal A1C is < 7)   No results found for: LABMICR  LDL Cholesterol (mg/dL)   Date Value   04/23/2022 91   09/05/2017 100       (goal LDL is <100)   AST (U/L)   Date Value   04/23/2022 28     ALT (U/L)   Date Value   04/23/2022 33     BUN (mg/dL)   Date Value   04/23/2022 9     BP Readings from Last 3 Encounters:   01/26/23 118/70   05/25/22 106/64   04/19/22 106/64          (goal 120/80)    All Future Testing planned in [facility]              Patient Active Problem List:     Nodule of tendon sheath     Class 3 severe obesity with serious comorbidity and body mass index (BMI) of 40.0 to 44.9 in adult (HCC)     Chronic right shoulder pain     Contracture of muscle of left lower extremity     Acute post-operative pain     Left leg pain     Calcific tendonitis of foot, left     Contracture of left ankle     Calcaneal spur of left foot     Heel pain, chronic, left     History of Stephanie-en-Y gastric bypass     History of COVID-19     Dyspnea

## 2023-02-24 ENCOUNTER — OFFICE VISIT (OUTPATIENT)
Dept: ORTHOPEDIC SURGERY | Age: 48
End: 2023-02-24

## 2023-02-24 VITALS — BODY MASS INDEX: 40.09 KG/M2 | WEIGHT: 280 LBS | RESPIRATION RATE: 16 BRPM | HEIGHT: 70 IN

## 2023-02-24 DIAGNOSIS — M25.511 RIGHT SHOULDER PAIN, UNSPECIFIED CHRONICITY: Primary | ICD-10-CM

## 2023-02-24 NOTE — PROGRESS NOTES
Orthopedic Shoulder Encounter Note     Chief complaint: right shoulder pain    HPI: Fidel Eddy is a 49 y.o.  right-hand dominant male who presents for evaluation of his right shoulder. He has been seen in the past for this and diagnosed with acromioclavicular joint arthritis. He received a cortisone injection to the joint which worked well for him for a brief period of time. He was subsequently seen by Dr. Linsey Carlson who [facility] on 3/22/2019 a year after he was seen by myself. This again worked for him but for a brief period of time. He returns today indicating that he has persistent pain over the superior aspect of the shoulder that is described as a constant ache and radiates diffusely about the shoulder. It similar to the pain that he has had in the past when he was seen by myself and Dr. Linsey Carlson. It is exacerbated by use. He states that he feels weaker as he uses the arm. Previous treatment:    NSAIDs: None. Tries to avoid due to previous gastric bypass surgery    Physical Therapy: Yes    Injections: Right shoulder acromioclavicular joint cortisone injection on 3/26/2018 by myself and an ultrasound-guided right AC joint injection on 3/22/2019 by Dr. Linsey Carlson     Surgeries: None    Review of Systems:   Constitutional: Negative for fever, chills, sweats. Pain Score:   5  Neurological: Negative for headache, numbness, or weakness. Musculoskeletal: As noted in HPI     Past Medical History  Severa Benjamin  has a past medical history of Knee pain, right and Shoulder pain, right. Past Surgical History  Severa Benjamin  has a past surgical history that includes Gastric bypass surgery (2014); Ankle surgery; Hand surgery (Left); and Achilles tendon surgery (Left, 12/3/2021).     Current Medications  Current Outpatient Medications   Medication Sig Dispense Refill    albuterol sulfate HFA (PROVENTIL;VENTOLIN;PROAIR) 108 (90 Base) MCG/ACT inhaler INHALE 2 PUFFS INTO THE LUNGS 4 TIMES DAILY AS NEEDED FOR WHEEZING OR SHORTNESS OF BREATH. 6.7 each 3    celecoxib (CELEBREX) 100 MG capsule TAKE 1 CAPSULE BY MOUTH EVERY DAY 90 capsule 1    vitamin D (ERGOCALCIFEROL) 1.25 MG (35506 UT) CAPS capsule Take 1 capsule by mouth once a week 12 capsule 1     No current facility-administered medications for this visit. Allergies  Allergies have been reviewed. Trista Mcallister is allergic to no known allergies. Social History  Trista Mcallister  reports that he has never smoked. He has never used smokeless tobacco. He reports current alcohol use. He reports that he does not use drugs. Family History  Jarrod's family history includes Diabetes in his father; Other in his father. Physical Exam:     Resp 16   Ht 5' 9.69\" (1.77 m)   Wt 280 lb (127 kg)   BMI 40.53 kg/m²    Constitutional: Patient is oriented to person, place, and time. Patient appears well-developed and well nourished. Mental Status/psychiatric: Behavior is normal. Thought content normal.  HENT: Negative otherwise noted  Head: Normocephalic and Atraumatic  Nose: Normal  Respiratory/Cardio: Effort normal. No respiratory distress. Neck: Normal range of motion Neck supple. Shoulder:    Skin: Skin is warm and dry; no swelling or obvious muscular atrophy.    Vasculature: 2+ radial pulses bilaterally  Neuro: Sensation grossly intact to light touch diffusely  Tenderness: Tender to palpation over the right acromioclavicular joint    ROM: (Degrees)    Right   A P   Left   A P    Elevation  135 150   Elevation  150   Abduction  110 145   Abduction  160    ER   70 85   ER   80   IR   T12    IR   T12   90 abd/ER      90 abd/ER     90 abd/IR      90 abd/IR     Crepitation  No    Crepitation No  Dyskenesia  No    Dyskenesia No      Muscle strength:    Right       Left    Deltoid   5    Deltoid   5  Supraspinatus  5    Supraspinatus  5  ER   5    ER   5  IR   5    IR   5    Special tests    Right   Rotator Cuff    Left    y   Painful arc    n   n   Pain with ER    n    n   Neer's     n    n   Hawkin's    n    n   Drop Arm    n  n   Lift off/Belly Press   n  n   ER Lag    n          AC Joint  y   AC tenderness   n  y   Cross-chest adduction  n       Labrum/biceps    y   Eastland's    n   n   Biceps sheer    n      n   Speed's/Yergason's   n    y   Tenderness Biceps Groove  n    n   Beny's    n         Instability  n   Ant Apprehension   n    n   Post Apprehension   n    n   Ant Load shift    n    n   Post Load shift   n   n   Sulcus     n  n   Generalized Laxity   n  n   Relocation test   n  n   Crank test     n  n   Dioni-superior escape  n       Imaging:  Xrays: 4 views of the right shoulder obtained on 2/24/23 were independently reviewed  Indications: Right shoulder pain  Findings: Normal glenohumeral joint space. Mild subchondral cystic changes involving the distal end of the clavicle. Type I acromion  Impression: Mild AC joint degeneration    Impression/Plan:     Bubba cMcormick is a 49 y.o. old male with right acromioclavicular joint osteoarthritis. We had a discussion about this and discussed treatment options available to him including continued conservative as well as surgical intervention. He is interested in moving ahead with surgery consequently we discussed the details of the procedure by way of an arthroscopic distal clavicle excision, risks and benefits of surgery, expected outcome and postoperative recovery course. Risks as discussed included but were not limited to risk of infection, wound healing problems, stiffness, persistent pain, instability, neurovascular injury, and anesthesia. He demonstrated a good understanding of our discussion and is inclined to move ahead with surgery but wants to discuss with his wife first.  He is going to have that conversation and notify us as to how he would like to proceed. I would recommend an MRI study of his shoulder prior to  scheduled surgery.   We will facilitate him getting appropriate preoperative medical clearance if he decides to move ahead with surgery. This note is created with the assistance of a speech recognition program.  While intending to generate adocument that actually reflects the content of the visit, the document can still have some errors including those of syntax and sound a like substitutions which may escape proof reading. It such instances, actual meaningcan be extrapolated by contextual diversion.     NA = Not assessed  RTC = Rotator cuff  RCT = Rotator cuff tear  ER = External rotation  IR = Internal rotation  AC = Acromioclavicular  GH = Glenohumeral  n = No  y = Yes

## 2023-02-24 NOTE — PROGRESS NOTES
Sx booking form scanned into chart. Labs (CBC & BMP) & right shoulder MRI will need to be ordered in the event the patient wants to proceed with sx.

## 2023-03-14 ENCOUNTER — TELEPHONE (OUTPATIENT)
Dept: ORTHOPEDIC SURGERY | Age: 48
End: 2023-03-14

## 2023-03-14 DIAGNOSIS — M25.511 RIGHT SHOULDER PAIN, UNSPECIFIED CHRONICITY: ICD-10-CM

## 2023-03-14 DIAGNOSIS — M19.011 OSTEOARTHRITIS OF RIGHT ACROMIOCLAVICULAR JOINT: Primary | ICD-10-CM

## 2023-03-14 NOTE — TELEPHONE ENCOUNTER
Patient called office back but phone was disconnected when [facility] transferred line. I called patient back and was able to talk with him. Patient would like to proceed with sx. Appt for 3/17 was canceled. Right shoulder MRI and labs (CBC & BMP) were ordered and patient notified of orders. He was instructed to complete MRI at either [facility] or [facility]. Patient was provided the number to schedule MRI. He was also instructed to contact his PCP about obtaining medical clearance for his upcoming sx. Patient voiced understanding and is awaiting call from sx .

## 2023-03-14 NOTE — TELEPHONE ENCOUNTER
DOUGLASM with patient to call office back to discuss his upcoming appt with Dr. Darlyn Rios on 3/17. Appt can be canceled if patient is wanting to proceed with sx.

## 2023-03-14 NOTE — TELEPHONE ENCOUNTER
Mikala Pichardo,    Could you please contact patient to schedule right clavicle sx with Dr. Yazan Callahan. Booking form scanned into chart on 2/24. Right shoulder MRI, CBC & BMP were ordered and patient were notified to complete them.

## 2023-03-16 NOTE — TELEPHONE ENCOUNTER
Attempted to call back, phone was answered but no one was on the line.     Called back and got VM so left a message

## 2023-04-08 ENCOUNTER — HOSPITAL ENCOUNTER (OUTPATIENT)
Dept: MRI IMAGING | Age: 48
End: 2023-04-08
Payer: COMMERCIAL

## 2023-04-08 ENCOUNTER — HOSPITAL ENCOUNTER (OUTPATIENT)
Age: 48
Discharge: HOME | End: 2023-04-08
Payer: COMMERCIAL

## 2023-04-08 DIAGNOSIS — M25.511 RIGHT SHOULDER PAIN, UNSPECIFIED CHRONICITY: ICD-10-CM

## 2023-04-08 DIAGNOSIS — M19.011 OSTEOARTHRITIS OF RIGHT ACROMIOCLAVICULAR JOINT: ICD-10-CM

## 2023-04-08 LAB
ANION GAP SERPL CALCULATED.3IONS-SCNC: 7 MMOL/L (ref 9–17)
BUN SERPL-MCNC: 12 MG/DL (ref 6–20)
CALCIUM SERPL-MCNC: 9.4 MG/DL (ref 8.6–10.4)
CHLORIDE SERPL-SCNC: 106 MMOL/L (ref 98–107)
CO2 SERPL-SCNC: 27 MMOL/L (ref 20–31)
CREAT SERPL-MCNC: 0.87 MG/DL (ref 0.7–1.2)
GFR SERPL CREATININE-BSD FRML MDRD: >60 ML/MIN/1.73M2
GLUCOSE SERPL-MCNC: 102 MG/DL (ref 70–99)
HCT VFR BLD AUTO: 41.8 % (ref 40.7–50.3)
HGB BLD-MCNC: 13.9 G/DL (ref 13–17)
MCH RBC QN AUTO: 30.8 PG (ref 25.2–33.5)
MCHC RBC AUTO-ENTMCNC: 33.3 G/DL (ref 28.4–34.8)
MCV RBC AUTO: 92.7 FL (ref 82.6–102.9)
NRBC AUTOMATED: 0 PER 100 WBC
PDW BLD-RTO: 13.8 % (ref 11.8–14.4)
PLATELET # BLD AUTO: 260 K/UL (ref 138–453)
PMV BLD AUTO: 9.4 FL (ref 8.1–13.5)
POTASSIUM SERPL-SCNC: 4.1 MMOL/L (ref 3.7–5.3)
RBC # BLD: 4.51 M/UL (ref 4.21–5.77)
SODIUM SERPL-SCNC: 140 MMOL/L (ref 135–144)
WBC # BLD AUTO: 6 K/UL (ref 3.5–11.3)

## 2023-04-08 PROCEDURE — 85027 COMPLETE CBC AUTOMATED: CPT

## 2023-04-08 PROCEDURE — 36415 COLL VENOUS BLD VENIPUNCTURE: CPT

## 2023-04-08 PROCEDURE — 80048 BASIC METABOLIC PNL TOTAL CA: CPT

## 2023-04-08 PROCEDURE — 73221 MRI JOINT UPR EXTREM W/O DYE: CPT

## 2023-05-25 ENCOUNTER — OFFICE VISIT (OUTPATIENT)
Dept: FAMILY MEDICINE CLINIC | Age: 48
End: 2023-05-25
Payer: COMMERCIAL

## 2023-05-25 VITALS
BODY MASS INDEX: 41.81 KG/M2 | WEIGHT: 288.8 LBS | OXYGEN SATURATION: 98 % | SYSTOLIC BLOOD PRESSURE: 108 MMHG | HEART RATE: 62 BPM | DIASTOLIC BLOOD PRESSURE: 70 MMHG

## 2023-05-25 DIAGNOSIS — G89.29 CHRONIC PAIN OF RIGHT KNEE: ICD-10-CM

## 2023-05-25 DIAGNOSIS — M25.561 CHRONIC PAIN OF RIGHT KNEE: ICD-10-CM

## 2023-05-25 DIAGNOSIS — Z01.818 PREOP EXAM FOR INTERNAL MEDICINE: Primary | ICD-10-CM

## 2023-05-25 DIAGNOSIS — G89.29 CHRONIC RIGHT SHOULDER PAIN: ICD-10-CM

## 2023-05-25 DIAGNOSIS — E55.9 VITAMIN D DEFICIENCY: ICD-10-CM

## 2023-05-25 DIAGNOSIS — M25.511 CHRONIC RIGHT SHOULDER PAIN: ICD-10-CM

## 2023-05-25 PROCEDURE — 93000 ELECTROCARDIOGRAM COMPLETE: CPT | Performed by: INTERNAL MEDICINE

## 2023-05-25 PROCEDURE — 99214 OFFICE O/P EST MOD 30 MIN: CPT | Performed by: INTERNAL MEDICINE

## 2023-05-25 PROCEDURE — G8427 DOCREV CUR MEDS BY ELIG CLIN: HCPCS | Performed by: INTERNAL MEDICINE

## 2023-05-25 PROCEDURE — 1036F TOBACCO NON-USER: CPT | Performed by: INTERNAL MEDICINE

## 2023-05-25 PROCEDURE — G8417 CALC BMI ABV UP PARAM F/U: HCPCS | Performed by: INTERNAL MEDICINE

## 2023-05-25 RX ORDER — ERGOCALCIFEROL 1.25 MG/1
50000 CAPSULE ORAL WEEKLY
Qty: 12 CAPSULE | Refills: 1 | Status: SHIPPED | OUTPATIENT
Start: 2023-05-25

## 2023-05-25 RX ORDER — CELECOXIB 100 MG/1
CAPSULE ORAL
Qty: 90 CAPSULE | Refills: 1 | Status: SHIPPED | OUTPATIENT
Start: 2023-05-25

## 2023-05-25 SDOH — ECONOMIC STABILITY: HOUSING INSECURITY
IN THE LAST 12 MONTHS, WAS THERE A TIME WHEN YOU DID NOT HAVE A STEADY PLACE TO SLEEP OR SLEPT IN A SHELTER (INCLUDING NOW)?: NO

## 2023-05-25 SDOH — ECONOMIC STABILITY: FOOD INSECURITY: WITHIN THE PAST 12 MONTHS, YOU WORRIED THAT YOUR FOOD WOULD RUN OUT BEFORE YOU GOT MONEY TO BUY MORE.: NEVER TRUE

## 2023-05-25 SDOH — ECONOMIC STABILITY: FOOD INSECURITY: WITHIN THE PAST 12 MONTHS, THE FOOD YOU BOUGHT JUST DIDN'T LAST AND YOU DIDN'T HAVE MONEY TO GET MORE.: NEVER TRUE

## 2023-05-25 SDOH — ECONOMIC STABILITY: INCOME INSECURITY: HOW HARD IS IT FOR YOU TO PAY FOR THE VERY BASICS LIKE FOOD, HOUSING, MEDICAL CARE, AND HEATING?: NOT HARD AT ALL

## 2023-05-25 NOTE — PATIENT INSTRUCTIONS
Last dose of celebrex will be on 6/20/23 - you may use tylenol as needed for pain control for the week prior to the surgery  Last dose of vit D, any multivitamins or other supplements on 6/20/23   You may resume the vit D and supplements 48 hours after your surgery   Resume celebrex when cleared by Dr Rabia Desai to do so

## 2023-05-25 NOTE — PROGRESS NOTES
Preoperative Consultation      John Hinkle  YOB: 1975    Date of Service:  5/25/2023    Vitals:    05/25/23 1611   BP: 108/70   Site: Left Upper Arm   Position: Sitting   Cuff Size: Large Adult   Pulse: 62   SpO2: 98%   Weight: 288 lb 12.8 oz (131 kg)      Wt Readings from Last 2 Encounters:   05/25/23 288 lb 12.8 oz (131 kg)   02/24/23 280 lb (127 kg)     BP Readings from Last 3 Encounters:   05/25/23 108/70   01/26/23 118/70   05/25/22 106/64        Chief Complaint   Patient presents with    Pre-op Exam     Allergies   Allergen Reactions    No Known Allergies      Outpatient Medications Marked as Taking for the 5/25/23 encounter (Office Visit) with Zoë Welsh MD   Medication Sig Dispense Refill    albuterol sulfate HFA (PROVENTIL;VENTOLIN;PROAIR) 108 (90 Base) MCG/ACT inhaler INHALE 2 PUFFS INTO THE LUNGS 4 TIMES DAILY AS NEEDED FOR WHEEZING OR SHORTNESS OF BREATH. 6.7 each 3    celecoxib (CELEBREX) 100 MG capsule TAKE 1 CAPSULE BY MOUTH EVERY DAY 90 capsule 1    vitamin D (ERGOCALCIFEROL) 1.25 MG (00082 UT) CAPS capsule Take 1 capsule by mouth once a week 12 capsule 1       This patient presents to the office today for a preoperative consultation at the request of surgeon, Dr. Colton Rasheed, who plans on performing R shoulder arthroscopy on June 27 at [facility]. The current problem began several years ago, and symptoms have been worsening with time. Conservative therapy: Yes: PT, medications, which has been ineffective. .    Planned anesthesia: General   Known anesthesia problems: None   Bleeding risk: No recent or remote history of abnormal bleeding  Personal or FH of DVT/PE: No    Patient objection to receiving blood products: No    Patient Active Problem List   Diagnosis    Nodule of tendon sheath    Class 3 severe obesity with serious comorbidity and body mass index (BMI) of 40.0 to 44.9 in adult [facility])    Chronic right shoulder pain    Contracture of muscle of left lower extremity    Acute

## 2023-05-25 NOTE — PROGRESS NOTES
Pt is here for a clearance to have RT shoulder surgery  Pre op testing was done [facility]'s

## 2023-06-01 ENCOUNTER — TELEPHONE (OUTPATIENT)
Dept: FAMILY MEDICINE CLINIC | Age: 48
End: 2023-06-01

## 2023-06-01 NOTE — TELEPHONE ENCOUNTER
----- Message from Ana Cancer sent at 6/1/2023  2:10 PM EDT -----  Subject: Message to Provider    QUESTIONS  Information for Provider? patient states you can get a copy of his EKG by   moe [phone]. The date of the EKG was 5/14/2014 and was done by Dr Olaf Echevarria. ---------------------------------------------------------------------------  --------------  Adin BUNCH  [phone]; OK to leave message on voicemail  ---------------------------------------------------------------------------  --------------  SCRIPT ANSWERS  Relationship to Patient?  Self

## 2023-06-19 ENCOUNTER — HOSPITAL ENCOUNTER (OUTPATIENT)
Dept: PREADMISSION TESTING | Age: 48
Discharge: HOME | End: 2023-06-23
Attending: ORTHOPAEDIC SURGERY | Admitting: ORTHOPAEDIC SURGERY
Payer: COMMERCIAL

## 2023-06-19 VITALS
DIASTOLIC BLOOD PRESSURE: 60 MMHG | SYSTOLIC BLOOD PRESSURE: 115 MMHG | RESPIRATION RATE: 24 BRPM | BODY MASS INDEX: 41.44 KG/M2 | HEART RATE: 63 BPM | HEIGHT: 70 IN | OXYGEN SATURATION: 98 % | TEMPERATURE: 98.9 F

## 2023-06-19 LAB
ANION GAP SERPL CALCULATED.3IONS-SCNC: 11 MMOL/L (ref 9–17)
BASOPHILS # BLD: 0 K/UL (ref 0–0.2)
BASOPHILS NFR BLD: 0 % (ref 0–2)
BUN SERPL-MCNC: 12 MG/DL (ref 6–20)
CALCIUM SERPL-MCNC: 9.5 MG/DL (ref 8.6–10.4)
CHLORIDE SERPL-SCNC: 105 MMOL/L (ref 98–107)
CO2 SERPL-SCNC: 27 MMOL/L (ref 20–31)
CREAT SERPL-MCNC: 0.83 MG/DL (ref 0.7–1.2)
EOSINOPHIL # BLD: 0.1 K/UL (ref 0–0.4)
EOSINOPHILS RELATIVE PERCENT: 2 % (ref 0–4)
ERYTHROCYTE [DISTWIDTH] IN BLOOD BY AUTOMATED COUNT: 14.5 % (ref 11.5–14.9)
GFR SERPL CREATININE-BSD FRML MDRD: >60 ML/MIN/1.73M2
GLUCOSE SERPL-MCNC: 110 MG/DL (ref 70–99)
HCT VFR BLD AUTO: 42.4 % (ref 41–53)
HGB BLD-MCNC: 14.2 G/DL (ref 13.5–17.5)
LYMPHOCYTES # BLD: 24 % (ref 24–44)
LYMPHOCYTES NFR BLD: 1.5 K/UL (ref 1–4.8)
MCH RBC QN AUTO: 30.7 PG (ref 26–34)
MCHC RBC AUTO-ENTMCNC: 33.5 G/DL (ref 31–37)
MCV RBC AUTO: 91.7 FL (ref 80–100)
MONOCYTES NFR BLD: 0.5 K/UL (ref 0.1–1.3)
MONOCYTES NFR BLD: 9 % (ref 1–7)
NEUTROPHILS NFR BLD: 65 % (ref 36–66)
NEUTS SEG NFR BLD: 4.1 K/UL (ref 1.3–9.1)
PLATELET # BLD AUTO: 254 K/UL (ref 150–450)
PMV BLD AUTO: 7.5 FL (ref 6–12)
POTASSIUM SERPL-SCNC: 4 MMOL/L (ref 3.7–5.3)
RBC # BLD AUTO: 4.63 M/UL (ref 4.5–5.9)
SODIUM SERPL-SCNC: 143 MMOL/L (ref 135–144)
WBC OTHER # BLD: 6.2 K/UL (ref 3.5–11)

## 2023-06-19 PROCEDURE — 36415 COLL VENOUS BLD VENIPUNCTURE: CPT

## 2023-06-19 PROCEDURE — 85027 COMPLETE CBC AUTOMATED: CPT

## 2023-06-19 PROCEDURE — 80048 BASIC METABOLIC PNL TOTAL CA: CPT

## 2023-06-19 PROCEDURE — APPSS45 APP SPLIT SHARED TIME 31-45 MINUTES: Performed by: NURSE PRACTITIONER

## 2023-06-19 NOTE — DISCHARGE INSTRUCTIONS
house officer. Your IV will be started and you will meet your anesthesiologist.    When you go to surgery, your family will be directed to the surgical waiting room, where the doctor should speak with them after your surgery. After surgery, you will be taken to the recovery room and or short stay unit for recovery and preparation for discharge. If you use a Bi-PAP or C-PAP machine, please bring it with you and leave it in the car in case it is needed in recovery room.

## 2023-06-20 ENCOUNTER — TELEPHONE (OUTPATIENT)
Dept: FAMILY MEDICINE CLINIC | Age: 48
End: 2023-06-20

## 2023-06-23 ENCOUNTER — OFFICE VISIT (OUTPATIENT)
Dept: ORTHOPEDIC SURGERY | Age: 48
End: 2023-06-23

## 2023-06-23 VITALS — WEIGHT: 288 LBS | RESPIRATION RATE: 14 BRPM | BODY MASS INDEX: 41.23 KG/M2 | HEIGHT: 70 IN

## 2023-06-23 DIAGNOSIS — M19.011 OSTEOARTHRITIS OF RIGHT ACROMIOCLAVICULAR JOINT: Primary | ICD-10-CM

## 2023-06-23 RX ORDER — ONDANSETRON 4 MG/1
4 TABLET, FILM COATED ORAL DAILY PRN
Qty: 20 TABLET | Refills: 0 | Status: SHIPPED | OUTPATIENT
Start: 2023-06-23

## 2023-06-23 RX ORDER — ACETAMINOPHEN 325 MG/1
1000 TABLET ORAL ONCE
OUTPATIENT
Start: 2023-06-23 | End: 2023-06-23

## 2023-06-23 RX ORDER — SODIUM CHLORIDE 0.9 % (FLUSH) 0.9 %
5-40 SYRINGE (ML) INJECTION EVERY 12 HOURS SCHEDULED
OUTPATIENT
Start: 2023-06-23

## 2023-06-23 RX ORDER — SODIUM CHLORIDE 0.9 % (FLUSH) 0.9 %
5-40 SYRINGE (ML) INJECTION PRN
OUTPATIENT
Start: 2023-06-23

## 2023-06-23 RX ORDER — HYDROCODONE BITARTRATE AND ACETAMINOPHEN 5; 325 MG/1; MG/1
1 TABLET ORAL EVERY 4 HOURS PRN
Qty: 42 TABLET | Refills: 0 | Status: SHIPPED | OUTPATIENT
Start: 2023-06-23 | End: 2023-06-30

## 2023-06-23 RX ORDER — SODIUM CHLORIDE 9 MG/ML
INJECTION, SOLUTION INTRAVENOUS PRN
OUTPATIENT
Start: 2023-06-23

## 2023-06-23 NOTE — PROGRESS NOTES
HPI: Mr. Jatinder Garcia is 43-year-old gentleman here today for his preoperative visit regarding right shoulder. He has right shoulder acromioclavicular joint arthritis that has failed attempts at conservative management. Consequently he is electing to proceed with surgery by way of an arthroscopic distal clavicle excision. We once again had a discussion about the details of surgery, postoperative recovery course and expected outcome. All questions were answered. He has obtained appropriate preoperative medical clearance. Evaluation of his shoulder today demonstrates intact skin without warmth erythema or swelling. He was provided his prescriptions for postoperative analgesia and a sling to protect his shoulder leading up to and following surgery. All questions were answered. We will proceed with surgery as currently scheduled.

## 2023-06-28 ENCOUNTER — ANESTHESIA EVENT (OUTPATIENT)
Dept: OPERATING ROOM | Age: 48
End: 2023-06-28
Payer: COMMERCIAL

## 2023-06-29 ENCOUNTER — HOSPITAL ENCOUNTER (OUTPATIENT)
Age: 48
Setting detail: OUTPATIENT SURGERY
Discharge: HOME | End: 2023-06-29
Attending: ORTHOPAEDIC SURGERY | Admitting: ORTHOPAEDIC SURGERY
Payer: COMMERCIAL

## 2023-06-29 ENCOUNTER — ANESTHESIA (OUTPATIENT)
Dept: OPERATING ROOM | Age: 48
End: 2023-06-29
Payer: COMMERCIAL

## 2023-06-29 VITALS
WEIGHT: 285 LBS | HEIGHT: 70 IN | HEART RATE: 77 BPM | DIASTOLIC BLOOD PRESSURE: 58 MMHG | TEMPERATURE: 97 F | SYSTOLIC BLOOD PRESSURE: 108 MMHG | RESPIRATION RATE: 14 BRPM | BODY MASS INDEX: 40.8 KG/M2 | OXYGEN SATURATION: 93 %

## 2023-06-29 PROCEDURE — 6370000000 HC RX 637 (ALT 250 FOR IP): Performed by: ORTHOPAEDIC SURGERY

## 2023-06-29 PROCEDURE — 6360000002 HC RX W HCPCS: Performed by: ORTHOPAEDIC SURGERY

## 2023-06-29 PROCEDURE — 6360000002 HC RX W HCPCS: Performed by: ANESTHESIOLOGY

## 2023-06-29 PROCEDURE — 3600000013 HC SURGERY LEVEL 3 ADDTL 15MIN: Performed by: ORTHOPAEDIC SURGERY

## 2023-06-29 PROCEDURE — 6360000002 HC RX W HCPCS: Performed by: NURSE ANESTHETIST, CERTIFIED REGISTERED

## 2023-06-29 PROCEDURE — 7100000010 HC PHASE II RECOVERY - FIRST 15 MIN: Performed by: ORTHOPAEDIC SURGERY

## 2023-06-29 PROCEDURE — 7100000000 HC PACU RECOVERY - FIRST 15 MIN: Performed by: ORTHOPAEDIC SURGERY

## 2023-06-29 PROCEDURE — 2500000003 HC RX 250 WO HCPCS: Performed by: ANESTHESIOLOGY

## 2023-06-29 PROCEDURE — 7100000031 HC ASPR PHASE II RECOVERY - ADDTL 15 MIN: Performed by: ORTHOPAEDIC SURGERY

## 2023-06-29 PROCEDURE — 2720000010 HC SURG SUPPLY STERILE: Performed by: ORTHOPAEDIC SURGERY

## 2023-06-29 PROCEDURE — 2500000003 HC RX 250 WO HCPCS: Performed by: NURSE ANESTHETIST, CERTIFIED REGISTERED

## 2023-06-29 PROCEDURE — 3700000000 HC ANESTHESIA ATTENDED CARE: Performed by: ORTHOPAEDIC SURGERY

## 2023-06-29 PROCEDURE — 64415 NJX AA&/STRD BRCH PLXS IMG: CPT | Performed by: ANESTHESIOLOGY

## 2023-06-29 PROCEDURE — 2580000003 HC RX 258: Performed by: ANESTHESIOLOGY

## 2023-06-29 PROCEDURE — 7100000030 HC ASPR PHASE II RECOVERY - FIRST 15 MIN: Performed by: ORTHOPAEDIC SURGERY

## 2023-06-29 PROCEDURE — 7100000001 HC PACU RECOVERY - ADDTL 15 MIN: Performed by: ORTHOPAEDIC SURGERY

## 2023-06-29 PROCEDURE — 7100000011 HC PHASE II RECOVERY - ADDTL 15 MIN: Performed by: ORTHOPAEDIC SURGERY

## 2023-06-29 PROCEDURE — 3700000001 HC ADD 15 MINUTES (ANESTHESIA): Performed by: ORTHOPAEDIC SURGERY

## 2023-06-29 PROCEDURE — 3600000003 HC SURGERY LEVEL 3 BASE: Performed by: ORTHOPAEDIC SURGERY

## 2023-06-29 PROCEDURE — 2709999900 HC NON-CHARGEABLE SUPPLY: Performed by: ORTHOPAEDIC SURGERY

## 2023-06-29 PROCEDURE — 6370000000 HC RX 637 (ALT 250 FOR IP): Performed by: ANESTHESIOLOGY

## 2023-06-29 PROCEDURE — 2580000003 HC RX 258: Performed by: NURSE ANESTHETIST, CERTIFIED REGISTERED

## 2023-06-29 PROCEDURE — 29824 SHO ARTHRS SRG DSTL CLAVICLC: CPT | Performed by: ORTHOPAEDIC SURGERY

## 2023-06-29 RX ORDER — LIDOCAINE HYDROCHLORIDE 10 MG/ML
INJECTION, SOLUTION INFILTRATION; PERINEURAL
Status: COMPLETED | OUTPATIENT
Start: 2023-06-29 | End: 2023-06-29

## 2023-06-29 RX ORDER — LIDOCAINE HYDROCHLORIDE 10 MG/ML
1 INJECTION, SOLUTION EPIDURAL; INFILTRATION; INTRACAUDAL; PERINEURAL
Status: DISCONTINUED | OUTPATIENT
Start: 2023-06-29 | End: 2023-06-29 | Stop reason: HOSPADM

## 2023-06-29 RX ORDER — DEXAMETHASONE SODIUM PHOSPHATE 4 MG/ML
4 INJECTION, SOLUTION INTRA-ARTICULAR; INTRALESIONAL; INTRAMUSCULAR; INTRAVENOUS; SOFT TISSUE ONCE
Status: DISCONTINUED | OUTPATIENT
Start: 2023-06-29 | End: 2023-06-29

## 2023-06-29 RX ORDER — HYDROCODONE BITARTRATE AND ACETAMINOPHEN 5; 325 MG/1; MG/1
1 TABLET ORAL EVERY 6 HOURS PRN
Status: CANCELLED | OUTPATIENT
Start: 2023-06-29

## 2023-06-29 RX ORDER — EPHEDRINE SULFATE/0.9% NACL/PF 50 MG/5 ML
SYRINGE (ML) INTRAVENOUS PRN
Status: DISCONTINUED | OUTPATIENT
Start: 2023-06-29 | End: 2023-06-29 | Stop reason: SDUPTHER

## 2023-06-29 RX ORDER — ROCURONIUM BROMIDE 10 MG/ML
INJECTION, SOLUTION INTRAVENOUS PRN
Status: DISCONTINUED | OUTPATIENT
Start: 2023-06-29 | End: 2023-06-29 | Stop reason: SDUPTHER

## 2023-06-29 RX ORDER — FENTANYL CITRATE 50 UG/ML
INJECTION, SOLUTION INTRAMUSCULAR; INTRAVENOUS PRN
Status: DISCONTINUED | OUTPATIENT
Start: 2023-06-29 | End: 2023-06-29 | Stop reason: SDUPTHER

## 2023-06-29 RX ORDER — ACETAMINOPHEN 500 MG
1000 TABLET ORAL ONCE
Status: COMPLETED | OUTPATIENT
Start: 2023-06-29 | End: 2023-06-29

## 2023-06-29 RX ORDER — SODIUM CHLORIDE 0.9 % (FLUSH) 0.9 %
5-40 SYRINGE (ML) INJECTION EVERY 12 HOURS SCHEDULED
Status: CANCELLED | OUTPATIENT
Start: 2023-06-29

## 2023-06-29 RX ORDER — DEXAMETHASONE SODIUM PHOSPHATE 4 MG/ML
4 INJECTION, SOLUTION INTRA-ARTICULAR; INTRALESIONAL; INTRAMUSCULAR; INTRAVENOUS; SOFT TISSUE ONCE
Status: DISCONTINUED | OUTPATIENT
Start: 2023-06-29 | End: 2023-06-29 | Stop reason: HOSPADM

## 2023-06-29 RX ORDER — HYDRALAZINE HYDROCHLORIDE 20 MG/ML
10 INJECTION INTRAMUSCULAR; INTRAVENOUS
Status: CANCELLED | OUTPATIENT
Start: 2023-06-29

## 2023-06-29 RX ORDER — METOCLOPRAMIDE HYDROCHLORIDE 5 MG/ML
10 INJECTION INTRAMUSCULAR; INTRAVENOUS
Status: CANCELLED | OUTPATIENT
Start: 2023-06-29 | End: 2023-06-30

## 2023-06-29 RX ORDER — ACETAMINOPHEN 500 MG
1000 TABLET ORAL ONCE
Status: DISCONTINUED | OUTPATIENT
Start: 2023-06-29 | End: 2023-06-29 | Stop reason: HOSPADM

## 2023-06-29 RX ORDER — DIPHENHYDRAMINE HYDROCHLORIDE 50 MG/ML
12.5 INJECTION INTRAMUSCULAR; INTRAVENOUS
Status: CANCELLED | OUTPATIENT
Start: 2023-06-29 | End: 2023-06-30

## 2023-06-29 RX ORDER — SODIUM CHLORIDE 0.9 % (FLUSH) 0.9 %
5-40 SYRINGE (ML) INJECTION EVERY 12 HOURS SCHEDULED
Status: DISCONTINUED | OUTPATIENT
Start: 2023-06-29 | End: 2023-06-29 | Stop reason: HOSPADM

## 2023-06-29 RX ORDER — PHENYLEPHRINE HYDROCHLORIDE 10 MG/ML
INJECTION INTRAVENOUS PRN
Status: DISCONTINUED | OUTPATIENT
Start: 2023-06-29 | End: 2023-06-29 | Stop reason: SDUPTHER

## 2023-06-29 RX ORDER — PROPOFOL 10 MG/ML
INJECTION, EMULSION INTRAVENOUS PRN
Status: DISCONTINUED | OUTPATIENT
Start: 2023-06-29 | End: 2023-06-29 | Stop reason: SDUPTHER

## 2023-06-29 RX ORDER — SODIUM CHLORIDE 0.9 % (FLUSH) 0.9 %
5-40 SYRINGE (ML) INJECTION PRN
Status: DISCONTINUED | OUTPATIENT
Start: 2023-06-29 | End: 2023-06-29 | Stop reason: HOSPADM

## 2023-06-29 RX ORDER — SODIUM CHLORIDE 9 MG/ML
INJECTION, SOLUTION INTRAVENOUS PRN
Status: DISCONTINUED | OUTPATIENT
Start: 2023-06-29 | End: 2023-06-29 | Stop reason: HOSPADM

## 2023-06-29 RX ORDER — ROPIVACAINE HYDROCHLORIDE 5 MG/ML
20 INJECTION, SOLUTION EPIDURAL; INFILTRATION; PERINEURAL ONCE
Status: DISCONTINUED | OUTPATIENT
Start: 2023-06-29 | End: 2023-06-29 | Stop reason: HOSPADM

## 2023-06-29 RX ORDER — ONDANSETRON 2 MG/ML
4 INJECTION INTRAMUSCULAR; INTRAVENOUS
Status: CANCELLED | OUTPATIENT
Start: 2023-06-29 | End: 2023-06-30

## 2023-06-29 RX ORDER — LIDOCAINE HYDROCHLORIDE 10 MG/ML
INJECTION, SOLUTION EPIDURAL; INFILTRATION; INTRACAUDAL; PERINEURAL PRN
Status: DISCONTINUED | OUTPATIENT
Start: 2023-06-29 | End: 2023-06-29 | Stop reason: SDUPTHER

## 2023-06-29 RX ORDER — SODIUM CHLORIDE 9 MG/ML
INJECTION, SOLUTION INTRAVENOUS PRN
Status: CANCELLED | OUTPATIENT
Start: 2023-06-29

## 2023-06-29 RX ORDER — MIDAZOLAM HYDROCHLORIDE 1 MG/ML
INJECTION INTRAMUSCULAR; INTRAVENOUS PRN
Status: DISCONTINUED | OUTPATIENT
Start: 2023-06-29 | End: 2023-06-29 | Stop reason: SDUPTHER

## 2023-06-29 RX ORDER — GABAPENTIN 300 MG/1
300 CAPSULE ORAL ONCE
Status: COMPLETED | OUTPATIENT
Start: 2023-06-29 | End: 2023-06-29

## 2023-06-29 RX ORDER — SODIUM CHLORIDE 0.9 % (FLUSH) 0.9 %
5-40 SYRINGE (ML) INJECTION PRN
Status: CANCELLED | OUTPATIENT
Start: 2023-06-29

## 2023-06-29 RX ORDER — LABETALOL HYDROCHLORIDE 5 MG/ML
10 INJECTION, SOLUTION INTRAVENOUS
Status: CANCELLED | OUTPATIENT
Start: 2023-06-29

## 2023-06-29 RX ORDER — DEXAMETHASONE SODIUM PHOSPHATE 10 MG/ML
10 INJECTION, SOLUTION INTRAMUSCULAR; INTRAVENOUS ONCE
Status: DISCONTINUED | OUTPATIENT
Start: 2023-06-29 | End: 2023-06-29 | Stop reason: HOSPADM

## 2023-06-29 RX ORDER — SODIUM CHLORIDE, SODIUM LACTATE, POTASSIUM CHLORIDE, CALCIUM CHLORIDE 600; 310; 30; 20 MG/100ML; MG/100ML; MG/100ML; MG/100ML
INJECTION, SOLUTION INTRAVENOUS CONTINUOUS
Status: DISCONTINUED | OUTPATIENT
Start: 2023-06-29 | End: 2023-06-29 | Stop reason: HOSPADM

## 2023-06-29 RX ORDER — FENTANYL CITRATE 0.05 MG/ML
25 INJECTION, SOLUTION INTRAMUSCULAR; INTRAVENOUS EVERY 5 MIN PRN
Status: CANCELLED | OUTPATIENT
Start: 2023-06-29

## 2023-06-29 RX ORDER — ONDANSETRON 2 MG/ML
INJECTION INTRAMUSCULAR; INTRAVENOUS PRN
Status: DISCONTINUED | OUTPATIENT
Start: 2023-06-29 | End: 2023-06-29 | Stop reason: SDUPTHER

## 2023-06-29 RX ORDER — GLYCOPYRROLATE 0.2 MG/ML
INJECTION INTRAMUSCULAR; INTRAVENOUS PRN
Status: DISCONTINUED | OUTPATIENT
Start: 2023-06-29 | End: 2023-06-29 | Stop reason: SDUPTHER

## 2023-06-29 RX ORDER — MIDAZOLAM HYDROCHLORIDE 1 MG/ML
2 INJECTION INTRAMUSCULAR; INTRAVENOUS ONCE
Status: COMPLETED | OUTPATIENT
Start: 2023-06-29 | End: 2023-06-29

## 2023-06-29 RX ORDER — ROPIVACAINE HYDROCHLORIDE 5 MG/ML
INJECTION, SOLUTION EPIDURAL; INFILTRATION; PERINEURAL
Status: COMPLETED | OUTPATIENT
Start: 2023-06-29 | End: 2023-06-29

## 2023-06-29 RX ADMIN — ACETAMINOPHEN 1000 MG: 500 TABLET ORAL at 07:59

## 2023-06-29 RX ADMIN — Medication 5 MG: at 09:22

## 2023-06-29 RX ADMIN — SUGAMMADEX 200 MG: 100 INJECTION, SOLUTION INTRAVENOUS at 09:58

## 2023-06-29 RX ADMIN — PHENYLEPHRINE HYDROCHLORIDE 100 MCG: 10 INJECTION INTRAVENOUS at 08:57

## 2023-06-29 RX ADMIN — Medication 5 MG: at 09:46

## 2023-06-29 RX ADMIN — Medication 10 MG: at 09:34

## 2023-06-29 RX ADMIN — GLYCOPYRROLATE 0.2 MG: 0.2 INJECTION INTRAMUSCULAR; INTRAVENOUS at 09:08

## 2023-06-29 RX ADMIN — PHENYLEPHRINE HYDROCHLORIDE 200 MCG: 10 INJECTION INTRAVENOUS at 08:48

## 2023-06-29 RX ADMIN — ROCURONIUM BROMIDE 10 MG: 10 INJECTION, SOLUTION INTRAVENOUS at 09:15

## 2023-06-29 RX ADMIN — MIDAZOLAM 2 MG: 1 INJECTION INTRAMUSCULAR; INTRAVENOUS at 08:30

## 2023-06-29 RX ADMIN — ROCURONIUM BROMIDE 50 MG: 10 INJECTION, SOLUTION INTRAVENOUS at 08:35

## 2023-06-29 RX ADMIN — PHENYLEPHRINE HYDROCHLORIDE 100 MCG: 10 INJECTION INTRAVENOUS at 08:53

## 2023-06-29 RX ADMIN — PROPOFOL 50 MG: 10 INJECTION, EMULSION INTRAVENOUS at 08:38

## 2023-06-29 RX ADMIN — SODIUM CHLORIDE, POTASSIUM CHLORIDE, SODIUM LACTATE AND CALCIUM CHLORIDE: 600; 310; 30; 20 INJECTION, SOLUTION INTRAVENOUS at 09:48

## 2023-06-29 RX ADMIN — LIDOCAINE HYDROCHLORIDE 10 ML: 10 INJECTION, SOLUTION INFILTRATION; PERINEURAL at 08:05

## 2023-06-29 RX ADMIN — LIDOCAINE HYDROCHLORIDE 40 MG: 10 INJECTION, SOLUTION EPIDURAL; INFILTRATION; INTRACAUDAL; PERINEURAL at 08:34

## 2023-06-29 RX ADMIN — Medication 10 MG: at 09:02

## 2023-06-29 RX ADMIN — SODIUM CHLORIDE, POTASSIUM CHLORIDE, SODIUM LACTATE AND CALCIUM CHLORIDE: 600; 310; 30; 20 INJECTION, SOLUTION INTRAVENOUS at 08:28

## 2023-06-29 RX ADMIN — FENTANYL CITRATE 50 MCG: 50 INJECTION, SOLUTION INTRAMUSCULAR; INTRAVENOUS at 08:34

## 2023-06-29 RX ADMIN — ONDANSETRON 4 MG: 2 INJECTION INTRAMUSCULAR; INTRAVENOUS at 09:54

## 2023-06-29 RX ADMIN — GABAPENTIN 300 MG: 300 CAPSULE ORAL at 07:59

## 2023-06-29 RX ADMIN — Medication 10 MG: at 09:14

## 2023-06-29 RX ADMIN — Medication 3000 MG: at 08:45

## 2023-06-29 RX ADMIN — PHENYLEPHRINE HYDROCHLORIDE 100 MCG: 10 INJECTION INTRAVENOUS at 08:54

## 2023-06-29 RX ADMIN — ROCURONIUM BROMIDE 10 MG: 10 INJECTION, SOLUTION INTRAVENOUS at 09:39

## 2023-06-29 RX ADMIN — PROPOFOL 200 MG: 10 INJECTION, EMULSION INTRAVENOUS at 08:34

## 2023-06-29 RX ADMIN — ROCURONIUM BROMIDE 10 MG: 10 INJECTION, SOLUTION INTRAVENOUS at 09:30

## 2023-06-29 RX ADMIN — PHENYLEPHRINE HYDROCHLORIDE 200 MCG: 10 INJECTION INTRAVENOUS at 08:46

## 2023-06-29 RX ADMIN — Medication 10 MG: at 09:24

## 2023-06-29 RX ADMIN — ROCURONIUM BROMIDE 10 MG: 10 INJECTION, SOLUTION INTRAVENOUS at 09:50

## 2023-06-29 RX ADMIN — PHENYLEPHRINE HYDROCHLORIDE 100 MCG: 10 INJECTION INTRAVENOUS at 09:00

## 2023-06-29 RX ADMIN — FENTANYL CITRATE 50 MCG: 50 INJECTION, SOLUTION INTRAMUSCULAR; INTRAVENOUS at 09:31

## 2023-06-29 RX ADMIN — PHENYLEPHRINE HYDROCHLORIDE 25 MCG/MIN: 10 INJECTION INTRAVENOUS at 08:52

## 2023-06-29 RX ADMIN — ROPIVACAINE HYDROCHLORIDE 20 ML: 5 INJECTION, SOLUTION EPIDURAL; INFILTRATION; PERINEURAL at 08:05

## 2023-06-29 RX ADMIN — MIDAZOLAM 2 MG: 1 INJECTION INTRAMUSCULAR; INTRAVENOUS at 08:04

## 2023-06-29 ASSESSMENT — PAIN - FUNCTIONAL ASSESSMENT
PAIN_FUNCTIONAL_ASSESSMENT: PREVENTS OR INTERFERES SOME ACTIVE ACTIVITIES AND ADLS
PAIN_FUNCTIONAL_ASSESSMENT: 0-10

## 2023-06-29 ASSESSMENT — ENCOUNTER SYMPTOMS: SHORTNESS OF BREATH: 0

## 2023-06-29 ASSESSMENT — PAIN DESCRIPTION - DESCRIPTORS: DESCRIPTORS: ACHING;SHARP;SHOOTING

## 2023-07-10 ENCOUNTER — OFFICE VISIT (OUTPATIENT)
Dept: ORTHOPEDIC SURGERY | Age: 48
End: 2023-07-10

## 2023-07-10 VITALS — RESPIRATION RATE: 14 BRPM | HEIGHT: 70 IN | BODY MASS INDEX: 42.09 KG/M2 | WEIGHT: 294 LBS

## 2023-07-10 DIAGNOSIS — Z98.890 STATUS POST SUBACROMIAL DECOMPRESSION: Primary | ICD-10-CM

## 2023-07-10 DIAGNOSIS — M25.511 RIGHT SHOULDER PAIN, UNSPECIFIED CHRONICITY: ICD-10-CM

## 2023-07-10 PROCEDURE — 99024 POSTOP FOLLOW-UP VISIT: CPT | Performed by: ORTHOPAEDIC SURGERY

## 2023-07-10 NOTE — PROGRESS NOTES
Procedure: Right shoulder arthroscopic distal clavicle excision  Date of procedure: 6/29/2023    HPI:  Marianna Favre is a 49 y.o. male who is approximately 2 weeks status post aforementioned procedure. Indicates that he is doing relatively well. His pain is rated as a 3/10. He denies having any fevers, chills, sweats or any constitutional symptoms. He has been compliant with his pendulums and sling immobilization. Physical examination:  Evaluation of patient's right shoulder and upper extremity demonstrates his incisions to be clean, dry, intact. There is no warmth, erythema, wound dehiscence or drainage. Sensation is grossly intact light touch in all dermatomes and he has a 2+ radial pulse with brisk capillary refill in his fingers. Impression and plan:  Marianna Favre is a 49 y.o. male who is 2 weeks status post an arthroscopic right shoulder arthroscopic distal clavicle excision. He is doing relatively well at this time. We reviewed his arthroscopic images. His sutures were taken out and Steri-Strips and clean dressings applied. He may now get his incisions wet in the shower. He can wean out of his sling and may start using the arm for light activities of daily living. We'll get him started in formal physical therapy and a prescription was provided. A letter was provided allowing him to return to work with a 1 to 2 pound restriction. I'll see him back for reevaluation in 4 weeks but he was encouraged to return or call earlier with questions and/or concerns.

## 2023-07-17 ENCOUNTER — HOSPITAL ENCOUNTER (OUTPATIENT)
Dept: PHYSICAL THERAPY | Age: 48
Setting detail: THERAPIES SERIES
Discharge: HOME | End: 2023-07-17
Payer: COMMERCIAL

## 2023-07-17 PROCEDURE — 97110 THERAPEUTIC EXERCISES: CPT

## 2023-07-17 PROCEDURE — 97161 PT EVAL LOW COMPLEX 20 MIN: CPT

## 2023-07-17 NOTE — CONSULTS
protocol      Evaluation Complexity:  History (Personal factors, comorbidities) [] 0 [x] 1-2 [] 3+   Exam (limitations, restrictions) [x] 1-2 [] 3 [] 4+   Clinical presentation (progression) [x] Stable [] Evolving  [] Unstable   Decision Making [x] Low [] Moderate [] High    [x] Low Complexity [] Moderate Complexity [] High Complexity       Treatment Charges: Mins Units   [x] Evaluation       [x]  Low       []  Moderate       []  High 30 1   []  Modalities     [x]  Ther Exercise 15 1   []  Manual Therapy     []  Ther Activities     []  Aquatics     []  Vasocompression     []  Other       TOTAL TREATMENT TIME: 45 min      Time in:805am     Time out:9:00am    Electronically signed by: Aldon Bernheim, PT

## 2023-07-19 ENCOUNTER — HOSPITAL ENCOUNTER (OUTPATIENT)
Dept: PHYSICAL THERAPY | Age: 48
Setting detail: THERAPIES SERIES
Discharge: HOME | End: 2023-07-19
Payer: COMMERCIAL

## 2023-07-19 PROCEDURE — 97016 VASOPNEUMATIC DEVICE THERAPY: CPT

## 2023-07-19 PROCEDURE — 97110 THERAPEUTIC EXERCISES: CPT

## 2023-07-19 NOTE — FLOWSHEET NOTE
[x] [facility]) - [facility] & Therapy  [address]  [place]: [phone]   F: [phone]    Physical Therapy Daily Treatment Note      Date:  2023  Patient Name:  Rosie Ruiz    :  1975  MRN: [id number]  Physician: Kimberlyn Medina MD                                 Insurance: Medical [place] ( Hard Max, No DN)  Medical Diagnosis:   Z98.890 (ICD-10-CM) - Status post subacromial decompression   M25.511 (ICD-10-CM) - Right shoulder pain, unspecified chronicity                             Rehab Codes: M25.511 Right shoulder pain  Onset Date: 23                                 Next 's appt: 2023  Visit# / total visits: 2  Cancels/No Shows: 0/0    Subjective:    Patient reports completing HEP and has felt improvement since initial eval with greater ease of moving shoulder. Pain:  [x] Yes  [] No Location: Right shoulder  Pain Rating: (0-10 scale) 3/10  Pain altered Tx:  [x] No  [] Yes  Action:  Comments:    Objective:  Modalities:   Game Ready   - Temp: 34 degrees   - Location: Right shoulder  - Position: seated  - Time: 10 minutes   - Pressure level: Low     Precautions: 2# lifting restriction, Follow Dr. Evette BARRAZA protocol  Exercises:  Exercise Reps/ Time Weight/ Level Completed  Today Comments   Towel slide 20x3\"  x    Cane flx, abd, ER 15x3\"  x Only ER on    Shoulder IR with towel 10x5\"  x    Pulleys x20ea  x Flexion, abd   Wall Post capsule stretch 10x5\"  x    Ball circles x10ea  x CW/CCW   T-band ER/IR 2x10 Red x    T-band rows x10  x    T-band shrug x10  Add NV    Other:    Specific Instructions for next treatment: Follow Dr. Evette BARRAZA protocol      Assessment: [x] Progressing toward goals. Initial visit after evaluation. Patient demonstrates good carryover with HEP. Added posterior capsule stretching, rotator cuff strengthening, and shoulder stabilization. Patient reports mild increase in pain 1-2/10 increase.  Ended session with vaso to

## 2023-07-24 ENCOUNTER — HOSPITAL ENCOUNTER (OUTPATIENT)
Dept: PHYSICAL THERAPY | Age: 48
Setting detail: THERAPIES SERIES
Discharge: HOME | End: 2023-07-24
Payer: COMMERCIAL

## 2023-07-24 PROCEDURE — 97016 VASOPNEUMATIC DEVICE THERAPY: CPT

## 2023-07-24 PROCEDURE — 97110 THERAPEUTIC EXERCISES: CPT

## 2023-07-24 NOTE — FLOWSHEET NOTE
[x] [facility]) - [facility] & Therapy  [address]  [place]: [phone]   F: [phone]    Physical Therapy Daily Treatment Note      Date:  2023  Patient Name:  Eunice Ortiz    :  1975  MRN: [id number]  Physician: Reid Ye MD                                 Insurance: Medical [place] ( Hard Max, No DN)  Medical Diagnosis:   Z98.890 (ICD-10-CM) - Status post subacromial decompression   M25.511 (ICD-10-CM) - Right shoulder pain, unspecified chronicity                             Rehab Codes: M25.511 Right shoulder pain  Onset Date: 23                                 Next 's appt: 2023  Visit# / total visits: 3/12  Cancels/No Shows: 0/0    Subjective:    Patient reports that he [place] a [place] He reports he is more sore today than anything. Pain:  [x] Yes  [] No Location: Right shoulder  Pain Rating: (0-10 scale) Soreness/10  Pain altered Tx:  [x] No  [] Yes  Action:  Comments:    Objective:  Modalities:   Game Ready   - Temp: 34 degrees   - Location: Right shoulder  - Position: seated  - Time: 10 minutes   - Pressure level: Low     Precautions: 2# lifting restriction, Follow Dr. Jocelyne BARRAZA protocol  Exercises:  Exercise Reps/ Time Weight/ Level Completed  Today Comments   UBE 2'/2' L1 x    Towel slide 20x3\"      Cane flx, abd, ER 15x3\"   Only ER on    Shoulder IR with towel 10x5\"  x    Pulleys x20ea   Flexion, abd   Wall slide x20  x    Wall Post capsule stretch 10x5\"  x    Ball circles 2x10ea  x CW/CCW   Wall sleeper stretch 3x20\"  x    Wall push up + 2x10  x    T-band ER/IR 2x10 Red x    T-band rows 2x10 Red x    T-band shrug 2x10 Green x    Other:    Specific Instructions for next treatment: Follow Dr. Jocelyne BARRAZA protocol      Assessment: [x] Progressing toward goals. Initiated session today with UBE to increase bloodflow prior to exercise.  Patient IR has improved   nitial visit after

## 2023-07-26 ENCOUNTER — OFFICE VISIT (OUTPATIENT)
Dept: FAMILY MEDICINE CLINIC | Age: 48
End: 2023-07-26
Payer: COMMERCIAL

## 2023-07-26 VITALS
HEIGHT: 70 IN | BODY MASS INDEX: 41.78 KG/M2 | WEIGHT: 291.8 LBS | OXYGEN SATURATION: 98 % | HEART RATE: 55 BPM | SYSTOLIC BLOOD PRESSURE: 130 MMHG | DIASTOLIC BLOOD PRESSURE: 80 MMHG

## 2023-07-26 DIAGNOSIS — E66.01 CLASS 3 SEVERE OBESITY WITH SERIOUS COMORBIDITY AND BODY MASS INDEX (BMI) OF 40.0 TO 44.9 IN ADULT, UNSPECIFIED OBESITY TYPE (HCC): ICD-10-CM

## 2023-07-26 DIAGNOSIS — G89.29 CHRONIC RIGHT SHOULDER PAIN: Primary | ICD-10-CM

## 2023-07-26 DIAGNOSIS — M25.511 CHRONIC RIGHT SHOULDER PAIN: Primary | ICD-10-CM

## 2023-07-26 DIAGNOSIS — R00.1 BRADYCARDIA WITH 51-60 BEATS PER MINUTE: ICD-10-CM

## 2023-07-26 PROCEDURE — G8427 DOCREV CUR MEDS BY ELIG CLIN: HCPCS | Performed by: INTERNAL MEDICINE

## 2023-07-26 PROCEDURE — G8417 CALC BMI ABV UP PARAM F/U: HCPCS | Performed by: INTERNAL MEDICINE

## 2023-07-26 PROCEDURE — 99214 OFFICE O/P EST MOD 30 MIN: CPT | Performed by: INTERNAL MEDICINE

## 2023-07-26 PROCEDURE — 1036F TOBACCO NON-USER: CPT | Performed by: INTERNAL MEDICINE

## 2023-07-26 ASSESSMENT — ENCOUNTER SYMPTOMS
WHEEZING: 0
DIARRHEA: 0
CHEST TIGHTNESS: 0
ABDOMINAL PAIN: 0
CHOKING: 0
SHORTNESS OF BREATH: 0
COUGH: 0
VOMITING: 0
BLOOD IN STOOL: 0
ANAL BLEEDING: 0
CONSTIPATION: 0
NAUSEA: 0

## 2023-07-26 ASSESSMENT — VISUAL ACUITY: OU: 1

## 2023-07-26 NOTE — PROGRESS NOTES
[facility]  [facility] [address]  [address] [facility] [address]  Dept: [phone]  Dept Fax: [phone]      Ildefonso Neves is a 49 y.o. male who presents today for hismedical conditions/complaints as noted below. Ildefonso Neves is c/o of Chronic Pain        Assessment/Plan:     1. Chronic right shoulder pain  2. Bradycardia with 51-60 beats per minute  3. Class 3 severe obesity with serious comorbidity and body mass index (BMI) of 40.0 to 44.9 in adult, unspecified obesity type ([address])          No follow-ups on file. HPI     Underwent shoulder urgery, doing well, doing PT currently. Able to bring arm over to grab left shoudler today. ROM is back   Shoulder pain is better, does have pain with repetitive motions   Has a  at the gym he is working with, returning to exercise         BP Readings from Last 3 Encounters:   07/26/23 130/80   06/29/23 (!) 108/58   06/19/23 115/60              Past Medical History:   Diagnosis Date    Knee pain, right     Shoulder pain, right       Past Surgical History:   Procedure Laterality Date    ACHILLES TENDON SURGERY Left 12/3/2021    LEFT ACHILLES REPAIR 2 DEGREE     LEFT CALCANEAL SPUR RESECTION          LEFT [facility] TENDON TRANSFER       LEFT GASTROC RECESSION   - [address] performed by Jenna Nielsen DPM at [facility]      left    GASTRIC BYPASS SURGERY  2014    nellie en y    HAND SURGERY Left     SHOULDER ARTHROSCOPY Right 6/29/2023    SHOULDER ARTHROSCOPIC DISTAL CLAVICLE EXCISION performed by Prabhjot Arias MD at [facility] OR       Family History   Problem Relation Age of Onset    Diabetes Father     Other Father        Social History     Tobacco Use    Smoking status: Never    Smokeless tobacco: Never   Substance Use Topics    Alcohol use: Yes     Comment: occ        Allergies   Allergen Reactions    No Known Allergies      Prior to Visit Medications    Medication Sig Taking?  Authorizing Provider   celecoxib (CELEBREX)

## 2023-07-27 ENCOUNTER — HOSPITAL ENCOUNTER (OUTPATIENT)
Dept: PHYSICAL THERAPY | Age: 48
Setting detail: THERAPIES SERIES
Discharge: HOME | End: 2023-07-27
Payer: COMMERCIAL

## 2023-07-27 PROCEDURE — 97110 THERAPEUTIC EXERCISES: CPT

## 2023-07-27 PROCEDURE — 97016 VASOPNEUMATIC DEVICE THERAPY: CPT

## 2023-07-27 NOTE — FLOWSHEET NOTE
[x] [facility]) - [facility] & Therapy  [address]  [place]: [phone]   F: [phone]    Physical Therapy Daily Treatment Note      Date:  2023  Patient Name:  Cata De La Fuente    :  1975  MRN: [id number]  Physician: Gustabo Orozco MD                                 Insurance: Medical [place] ( Hard Max, No DN)  Medical Diagnosis:   Z98.890 (ICD-10-CM) - Status post subacromial decompression   M25.511 (ICD-10-CM) - Right shoulder pain, unspecified chronicity                             Rehab Codes: M25.511 Right shoulder pain  Onset Date: 23                                 Next 's appt: 2023  Visit# / total visits:   Cancels/No Shows: 0/0    Subjective:    Reports he was able to reach across with right arm to touch L UE without pain. Overall feels he better. Denies issues sleeping- able to side sleep R without issue and it has been 10+ years. Reports compliance with HEP and feels his ROM is improving.  Denies increased pain after last visit    Pain:  [x] Yes  [] No Location: R Shoulder- incisional near [facility] Pain Rating: (0-10 scale) 3/10  Pain altered Tx:  [x] No  [] Yes  Action:    Comments: Patient 4 weeks Post Op 23    Objective:  Modalities:   Game Ready   - Temp: 34 degrees   - Location: Right shoulder  - Position: seated  - Time: 10 minutes   - Pressure level: Low     Precautions: 2# lifting restriction, Follow Dr. Axel Riggs SAD protocol  Exercises:  Exercise Reps/ Time Weight/ Level Completed  Today Comments   UBE 2'/2' L1 x    Towel slide 20x3\"      Cane flx, abd, ER 15x3\"   Only ER on    Shoulder IR with towel 10x5\"  x    Pulleys x20ea   Flexion, abd   Wall slide (B) UE's Flexion x20  x    Wall Post capsule stretch 10x5\"  x    Ball circles 2x10ea  x CW/CCW @ 90* Flexion - Progress to 110-120* next visit as tolerated   Wall sleeper stretch 3x20\"  x Also instructed how to perform R Side Lying   Wall push up + 2x10  x    T-band ER/IR @

## 2023-08-01 ENCOUNTER — HOSPITAL ENCOUNTER (OUTPATIENT)
Dept: PHYSICAL THERAPY | Age: 48
Setting detail: THERAPIES SERIES
Discharge: HOME | End: 2023-08-01
Payer: COMMERCIAL

## 2023-08-01 PROCEDURE — 97110 THERAPEUTIC EXERCISES: CPT

## 2023-08-01 PROCEDURE — 97016 VASOPNEUMATIC DEVICE THERAPY: CPT

## 2023-08-01 NOTE — FLOWSHEET NOTE
[x] [facility]) - [facility] & Therapy  [address]  [place]: [phone]   F: [phone]    Physical Therapy Daily Treatment Note      Date:  2023  Patient Name:  Fazal Stearns    :  1975  MRN: [id number]  Physician: Ashlee Baca MD                                 Insurance: Medical [place] ( Hard Max, No DN)  Medical Diagnosis:   Z98.890 (ICD-10-CM) - Status post subacromial decompression   M25.511 (ICD-10-CM) - Right shoulder pain, unspecified chronicity                             Rehab Codes: M25.511 Right shoulder pain  Onset Date: 23                                 Next 's appt: 2023  Visit# / total visits:   Cancels/No Shows: 0/0    Subjective:    Patient reports he has been completing HEP. He reports soreness today but no significant pain. He still reports weakness when reaching cross body to put his phone on the  at night.      Pain:  [x] Yes  [] No Location: R Shoulder- incisional near [facility] Pain Rating: (0-10 scale) soreness/10  Pain altered Tx:  [x] No  [] Yes  Action:    Comments: Patient 4 weeks Post Op 23    Objective:  Modalities:   Game Ready   - Temp: 34 degrees   - Location: Right shoulder  - Position: seated  - Time: 10 minutes   - Pressure level: Low     Precautions: 2# lifting restriction, Follow Dr. Prema BARRAZA protocol  Exercises:  Exercise Reps/ Time Weight/ Level Completed  Today Comments   UBE 2'/2' L3 x    Towel slide 20x3\"      Cane flx, abd, ER 15x3\"   Only ER on    Shoulder IR with towel 10x5\"  x    Pulleys x20ea   Flexion, abd   Wall slide (B) UE's Flexion  x20  x Added lift off    Wall Post capsule stretch 10x5\"  x    Ball circles 2x10ea  x CW/CCW @ 110-120* Flexion   Wall sleeper stretch 3x20\"  x Also instructed how to perform R Side Lying   Wall push up + 2x10      Table push up  x10  x Waist  height   T-band ER/IR @ Neutral 2x10 Green x    T-band rows 2x10 Green  Increased resistance 23;

## 2023-08-07 ENCOUNTER — HOSPITAL ENCOUNTER (OUTPATIENT)
Dept: PHYSICAL THERAPY | Age: 48
Setting detail: THERAPIES SERIES
Discharge: HOME | End: 2023-08-07
Payer: COMMERCIAL

## 2023-08-07 ENCOUNTER — OFFICE VISIT (OUTPATIENT)
Dept: ORTHOPEDIC SURGERY | Age: 48
End: 2023-08-07

## 2023-08-07 VITALS — WEIGHT: 291 LBS | BODY MASS INDEX: 41.66 KG/M2 | RESPIRATION RATE: 16 BRPM | HEIGHT: 70 IN

## 2023-08-07 DIAGNOSIS — Z98.890 S/P ARTHROSCOPY OF SHOULDER: ICD-10-CM

## 2023-08-07 DIAGNOSIS — M19.011 OSTEOARTHRITIS OF RIGHT ACROMIOCLAVICULAR JOINT: Primary | ICD-10-CM

## 2023-08-07 PROCEDURE — 97110 THERAPEUTIC EXERCISES: CPT

## 2023-08-07 PROCEDURE — 97016 VASOPNEUMATIC DEVICE THERAPY: CPT

## 2023-08-07 PROCEDURE — 99024 POSTOP FOLLOW-UP VISIT: CPT | Performed by: ORTHOPAEDIC SURGERY

## 2023-08-07 NOTE — PROGRESS NOTES
Procedure: Right shoulder arthroscopic distal clavicle excision  Date of procedure: 6/29/2023    HPI:  Baldomero Quevedo is a 49 y.o. male who is approximately 6 weeks status post aforementioned procedure. He states that he is doing well and his shoulder feels good. He has minimal to no pain. The only time he has noticed anything is when he is laying on his left side and tries to reach across his body  or stepdown his phone. He states that he feels like there is some limitation in motion and has to force himself to reach all the way across. Therapy is going well for him. Overall no complaints. Physical examination:  Evaluation of patient's right shoulder and upper extremity demonstrates his incisions to be appropriately healed. Sensation is grossly intact light touch in all dermatomes and he has a 2+ radial pulse with brisk capillary refill in his fingers. He has approximately 150 degrees of shoulder elevation and abduction. Impression and plan:  Baldomero Quevedo is a 49 y.o. male who is 6 weeks status post an arthroscopic right shoulder arthroscopic distal clavicle excision. He is doing very well at this time making appropriate progress. He was encouraged to keep up with therapy and may continue to gradually increase use of this arm as he feels comfortable. I will see him back for reevaluation in 6 weeks but he was encouraged to return or call earlier with questions and or concerns. no

## 2023-08-07 NOTE — PROGRESS NOTES
[x] [facility]) - [facility] Therapy  [address]  [place]: [phone]   F: [phone]    Physical Therapy Daily Treatment and progress note      Date:  2023  Patient Name:  Malcolm Hein    :  1975  MRN: [id number]  Physician: Shady Sharma MD                                 Insurance: Medical [place] ( Hard Max, No DN)  Medical Diagnosis:   Z98.890 (ICD-10-CM) - Status post subacromial decompression   M25.511 (ICD-10-CM) - Right shoulder pain, unspecified chronicity                             Rehab Codes: M25.511 Right shoulder pain  Onset Date: 23                                 Next 's appt: 2023  Visit# / total visits:   Cancels/No Shows: 0/0    Subjective:    Patient saw Dr. Kevin Garcia with good report and he may progress as tolerated (see progress note below). He reports feeling well today with just some tightness in shoulder. He reports some pulling of heavy doors is difficult. He continues to feel improvement each session. Per physician note:   Impression and plan:  Malcolm Hein is a 49 y.o. male who is 6 weeks status post an arthroscopic right shoulder arthroscopic distal clavicle excision. He is doing very well at this time making appropriate progress. He was encouraged to keep up with therapy and may continue to gradually increase use of this arm as he feels comfortable. I will see him back for reevaluation in 6 weeks but he was encouraged to return or call earlier with questions and or concerns    Pain:  [x] Yes  [] No Location: R Shoulder- incisional near [facility] Pain Rating: (0-10 scale) tightness/10  Pain altered Tx:  [x] No  [] Yes  Action:    Comments: Patient 4 weeks Post Op 23    Objective:  Modalities:   Game Ready   - Temp: 34 degrees   - Location: Right shoulder  - Position: seated  - Time: 10 minutes   - Pressure level: Low     Precautions: Gradually progress lifting.  Follow Dr. Kevin Garcia SAD

## 2023-08-08 ENCOUNTER — TELEMEDICINE (OUTPATIENT)
Dept: FAMILY MEDICINE CLINIC | Age: 48
End: 2023-08-08
Payer: COMMERCIAL

## 2023-08-08 DIAGNOSIS — H91.91 HEARING LOSS OF RIGHT EAR, UNSPECIFIED HEARING LOSS TYPE: Primary | ICD-10-CM

## 2023-08-08 PROCEDURE — G8417 CALC BMI ABV UP PARAM F/U: HCPCS | Performed by: INTERNAL MEDICINE

## 2023-08-08 PROCEDURE — G8427 DOCREV CUR MEDS BY ELIG CLIN: HCPCS | Performed by: INTERNAL MEDICINE

## 2023-08-08 PROCEDURE — 99214 OFFICE O/P EST MOD 30 MIN: CPT | Performed by: INTERNAL MEDICINE

## 2023-08-08 PROCEDURE — 1036F TOBACCO NON-USER: CPT | Performed by: INTERNAL MEDICINE

## 2023-08-08 NOTE — PROGRESS NOTES
Ivonne Bach (:  1975) is a Established patient, presenting virtually for evaluation of the following:    Assessment & Plan   Below is the assessment and plan developed based on review of pertinent history, physical exam, labs, studies, and medications. 1. Hearing loss of right ear, unspecified hearing loss type  -     [facility] - Fadi Michaud, Audiology, [facility]  Will schedule patient to come in for ear flush in the morning tomorrow   No follow-ups on file. Subjective   Ringing in R ear, gets wax impaction in his ear especially R ear   Has been using OTC drops, getting better  Ringing in ears, comes and goes, notes has a hard time hearing when there is a lot of background noise. Has had to turn up the TV in the mornigns to be able to hear. Review of Systems   HENT:  Positive for hearing loss. All other systems reviewed and are negative.        Objective   Patient-Reported Vitals  No data recorded     Physical Exam  [INSTRUCTIONS:  \"[x]\" Indicates a positive item  \"[]\" Indicates a negative item  -- DELETE ALL ITEMS NOT EXAMINED]    Constitutional: [x] Appears well-developed and well-nourished [x] No apparent distress      [] Abnormal -     Mental status: [x] Alert and awake  [x] Oriented to person/place/time [x] Able to follow commands    [] Abnormal -     Eyes:   EOM    [x]  Normal    [] Abnormal -   Sclera  [x]  Normal    [] Abnormal -          Discharge [x]  None visible   [] Abnormal -     HENT: [x] Normocephalic, atraumatic  [] Abnormal -   [x] Mouth/Throat: Mucous membranes are moist    External Ears [x] Normal  [] Abnormal -    Neck: [x] No visualized mass [] Abnormal -     Pulmonary/Chest: [x] Respiratory effort normal   [x] No visualized signs of difficulty breathing or respiratory distress        [] Abnormal -      Musculoskeletal:   [x] Normal gait with no signs of ataxia         [x] Normal range of motion of neck        [] Abnormal -     Neurological:        [x] No Facial Asymmetry

## 2023-08-08 NOTE — PROGRESS NOTES
Pt is being seen today via [facility] for having some hearing issues     Pt states his RT pops when he swallows, if there is a lot of background noise it is hard for him to hear anything, does have some ringing in RT ear,     The hearing issues has been going on for a couple months but has been getting worse lately, has been having to turn volume up on tv more than usual     No pain, no drainage, no crustiness     Did try some OTC ear wax removal in RT ear, did help a little but still hard to hear in RT ear

## 2023-08-09 ENCOUNTER — APPOINTMENT (OUTPATIENT)
Dept: PHYSICAL THERAPY | Age: 48
End: 2023-08-09
Payer: COMMERCIAL

## 2023-08-09 ENCOUNTER — NURSE ONLY (OUTPATIENT)
Dept: FAMILY MEDICINE CLINIC | Age: 48
End: 2023-08-09
Payer: COMMERCIAL

## 2023-08-09 DIAGNOSIS — H91.91 HEARING LOSS OF RIGHT EAR, UNSPECIFIED HEARING LOSS TYPE: Primary | ICD-10-CM

## 2023-08-09 PROCEDURE — 69210 REMOVE IMPACTED EAR WAX UNI: CPT | Performed by: INTERNAL MEDICINE

## 2023-08-14 ENCOUNTER — HOSPITAL ENCOUNTER (OUTPATIENT)
Dept: PHYSICAL THERAPY | Age: 48
Setting detail: THERAPIES SERIES
Discharge: HOME | End: 2023-08-14
Payer: COMMERCIAL

## 2023-08-14 PROCEDURE — 97016 VASOPNEUMATIC DEVICE THERAPY: CPT

## 2023-08-14 PROCEDURE — 97110 THERAPEUTIC EXERCISES: CPT

## 2023-08-14 NOTE — FLOWSHEET NOTE
[x] [facility]) - [facility] & Therapy  [address]  [place]: [phone]   F: [phone]    Physical Therapy Daily Treatment note      Date:  2023  Patient Name:  Hosea Copeland    :  1975  MRN: [id number]  Physician: Roxine Aschoff, MD                                 Insurance: Medical [place] ( Hard Max, No DN)  Medical Diagnosis:   Z98.890 (ICD-10-CM) - Status post subacromial decompression   M25.511 (ICD-10-CM) - Right shoulder pain, unspecified chronicity                             Rehab Codes: M25.511 Right shoulder pain  Onset Date: 23                                 Next 's appt: 2023  Visit# / total visits:   Cancels/No Shows: 0/0    Subjective:    Patient saw Dr. Jose Alfaro with good report and he may progress as tolerated (see progress note below). He reports feeling well today with just some tightness in shoulder. He reports some pulling of heavy doors is difficult. He continues to feel improvement each session. Per physician note:   Impression and plan:  Hosea Copeland is a 49 y.o. male who is 6 weeks status post an arthroscopic right shoulder arthroscopic distal clavicle excision. He is doing very well at this time making appropriate progress. He was encouraged to keep up with therapy and may continue to gradually increase use of this arm as he feels comfortable. I will see him back for reevaluation in 6 weeks but he was encouraged to return or call earlier with questions and or concerns    Pain:  [x] Yes  [] No Location: R Shoulder- incisional near [facility] Pain Rating: (0-10 scale) tightness/10  Pain altered Tx:  [x] No  [] Yes  Action:    Comments: Patient 4 weeks Post Op 23    Objective:  Modalities:   Game Ready   - Temp: 34 degrees   - Location: Right shoulder  - Position: seated  - Time: 10 minutes   - Pressure level: Low     Precautions: Gradually progress lifting.  Follow Dr. Jose Alfaro SAD protocol  Exercises:  Exercise Reps/

## 2023-08-21 ENCOUNTER — HOSPITAL ENCOUNTER (OUTPATIENT)
Dept: PHYSICAL THERAPY | Age: 48
Setting detail: THERAPIES SERIES
Discharge: HOME | End: 2023-08-21
Payer: COMMERCIAL

## 2023-08-21 PROCEDURE — 97110 THERAPEUTIC EXERCISES: CPT

## 2023-08-21 PROCEDURE — 97016 VASOPNEUMATIC DEVICE THERAPY: CPT

## 2023-08-21 NOTE — FLOWSHEET NOTE
ability to perform activities of daily living including cooking, cleaning, and grocery shopping MET  Independent with Home Exercise Programs MET     LTG: (to be met in 12 treatments)  Patient will demonstrate ability to lift 5# from waist to overhead to simulate activity that may be required with woodworking  ? Strength:shoulder flexion and abduction to 4/5 or better to improve ability to return to woodworking. Patient will demonstrate knowledge in home exercise program to be able continue to progress strengthening after POC has completed for eventual return to [facility] reenactments. Patient will be able to lift 35# box lift from floor to waist to simulate picking up speaker which may be required for him at work. Pt. Education:  [x] Yes  [] No  [x] Reviewed Prior HEP/Ed  Method of Education: [x] Verbal  [x] Demo  [] Written  Comprehension of Education:  [x] Verbalizes understanding. [x] Demonstrates understanding. [] Needs review. [x] Demonstrates/verbalizes HEP/Ed previously given. Access Code: [email or web address]  URL: [email or web address]  Date: 08/07/2023  Prepared by: [employer]    Exercises  - Seated Shoulder Flexion Towel Slide at Table Top  - 1-2 x daily - 3-4 x weekly - 10-20 reps - 3 second hold  - Shoulder Flexion Overhead with Dowel  - 1-2 x daily - 3-4 x weekly - 10-20 reps  - Seated Shoulder Abduction PROM with dowel  - 1-2 x daily - 3-4 x weekly - 10-20 reps  - Seated Shoulder External Rotation AAROM with Cane and Hand in Neutral  - 1-2 x daily - 3-4 x weekly - 10-20 reps  - Standing Shoulder Internal Rotation Stretch with Towel  - 1-2 x daily - 3-4 x weekly - 10-20 reps - 3-5 second hold  - Standing shoulder flexion wall slides  - 1 x daily - 3-4 x weekly - 3 sets - 10 reps  - Standing Sleeper Stretch at [place]  - 1 x daily - 3-4 x weekly - 3 sets - 30 seconds hold  - Shoulder extension with resistance - Neutral  - 1 x daily - 3-4 x weekly - 2-3 sets - 10 reps  - Standing Shoulder Row with

## 2023-08-28 ENCOUNTER — HOSPITAL ENCOUNTER (OUTPATIENT)
Dept: PHYSICAL THERAPY | Age: 48
Setting detail: THERAPIES SERIES
Discharge: HOME | End: 2023-08-28
Payer: COMMERCIAL

## 2023-08-28 PROCEDURE — 97016 VASOPNEUMATIC DEVICE THERAPY: CPT

## 2023-08-28 PROCEDURE — 97110 THERAPEUTIC EXERCISES: CPT

## 2023-08-28 NOTE — FLOWSHEET NOTE
[x] [facility]) - [facility] Therapy  [address]  [place]: [phone]   F: [phone]    Physical Therapy Daily Treatment note      Date:  2023  Patient Name:  Hosea Copeland    :  1975  MRN: [id number]  Physician: Roxine Aschoff, MD                                 Insurance: Medical [place] ( Hard Max, No DN)  Medical Diagnosis:   Z98.890 (ICD-10-CM) - Status post subacromial decompression   M25.511 (ICD-10-CM) - Right shoulder pain, unspecified chronicity                             Rehab Codes: M25.511 Right shoulder pain  Onset Date: 23                                 Next 's appt: 2023  Visit# / total visits:   Cancels/No Shows: 0/0    Subjective:    Patient continues to note improvement with daily activities. Over the weekend he has used the weedwacker in which he had some soreness but no significant pain. Per physician note:   Impression and plan:  Hosea Copeland is a 49 y.o. male who is 6 weeks status post an arthroscopic right shoulder arthroscopic distal clavicle excision. He is doing very well at this time making appropriate progress. He was encouraged to keep up with therapy and may continue to gradually increase use of this arm as he feels comfortable. I will see him back for reevaluation in 6 weeks but he was encouraged to return or call earlier with questions and or concerns    Pain:  [x] Yes  [] No Location: R Shoulder- incisional near [facility] Pain Rating: (0-10 scale) tightness/10  Pain altered Tx:  [x] No  [] Yes  Action:    Comments:     Objective:  Modalities:   Game Ready   - Temp: 34 degrees   - Location: Right shoulder  - Position: seated  - Time: 10 minutes   - Pressure level: Low     Precautions: Gradually progress lifting.  Follow Dr. Jose BARRAZA protocol  Exercises:  Exercise Reps/ Time Weight/ Level Completed  Today Comments   UBE 2'/2' L4 x    Towel slide 20x3\"      Cane flx, abd, ER 15x3\"   Only ER on    Shoulder 31-Jan-2019

## 2023-09-08 ENCOUNTER — HOSPITAL ENCOUNTER (OUTPATIENT)
Dept: PHYSICAL THERAPY | Age: 48
Setting detail: THERAPIES SERIES
Discharge: HOME | End: 2023-09-08
Payer: COMMERCIAL

## 2023-09-08 PROCEDURE — 97016 VASOPNEUMATIC DEVICE THERAPY: CPT

## 2023-09-08 PROCEDURE — 97110 THERAPEUTIC EXERCISES: CPT

## 2023-09-08 NOTE — FLOWSHEET NOTE
[x] [facility]) - [facility] & Therapy  [address]  [place]: [phone]   F: [phone]    Physical Therapy Daily Treatment note      Date:  2023  Patient Name:  Francheska Elaine    :  1975  MRN: [id number]  Physician: Rosibel Linares MD                                 Insurance: Medical [place] ( Hard Max, No DN)  Medical Diagnosis:   Z98.890 (ICD-10-CM) - Status post subacromial decompression   M25.511 (ICD-10-CM) - Right shoulder pain, unspecified chronicity                             Rehab Codes: M25.511 Right shoulder pain  Onset Date: 23                                 Next 's appt: 2023  Visit# / total visits: 10/12  Cancels/No Shows: 0/0    Subjective:    Patient continues to report that he feels well. He continues to progress strengthening at home including with craft shows and his hanging boards. Per physician note:   Impression and plan:  Francheska Elaine is a 49 y.o. male who is 6 weeks status post an arthroscopic right shoulder arthroscopic distal clavicle excision. He is doing very well at this time making appropriate progress. He was encouraged to keep up with therapy and may continue to gradually increase use of this arm as he feels comfortable. I will see him back for reevaluation in 6 weeks but he was encouraged to return or call earlier with questions and or concerns    Pain:  [x] Yes  [] No Location: R Shoulder- incisional near [facility] Pain Rating: (0-10 scale) tightness/10  Pain altered Tx:  [x] No  [] Yes  Action:    Comments:     Objective:  Modalities:   Game Ready   - Temp: 34 degrees   - Location: Right shoulder  - Position: seated  - Time: 10 minutes   - Pressure level: Low     Precautions: Gradually progress lifting.  Follow Dr. Mark BARRAZA protocol  Exercises:  Exercise Reps/ Time Weight/ Level Completed  Today Comments   UBE 2'/2' L4 x    Towel slide 20x3\"      Cane flx, abd, ER 15x3\"   Only ER on    Shoulder IR with towel

## 2023-09-15 ENCOUNTER — HOSPITAL ENCOUNTER (OUTPATIENT)
Dept: PHYSICAL THERAPY | Age: 48
Setting detail: THERAPIES SERIES
Discharge: HOME | End: 2023-09-15
Payer: COMMERCIAL

## 2023-09-15 PROCEDURE — 97110 THERAPEUTIC EXERCISES: CPT

## 2023-09-15 NOTE — FLOWSHEET NOTE
Green  x Band at wrists   Wall Post capsule stretch 10x5\"      Ball circles 3x10ea 2#  CW/CCW @ 120* Flexion   Wall sleeper stretch 3x30\"   Also instructed how to perform R Side Lying   Doorway Lat stretch 4x10\"      Wall push up + 2x10      Table push up  2x15  x Waist  height   T-band ER/IR @ Neutral 3x10 Green     T-band shrug 2x10 Green     Cable column extension 2x10 10#     Cable column Rows 2x10 25# x    Cable Column Bicep Curls 2x10 25# x    [employer] 2x10 45# x    Reverse fly 2x8 10# x    Standing Rhythmic Stab Flex @ 90* Holding Yellow Ply Ball 2x30\" 2#  Added 7/27/23   TG: I,Y,T 2x10ea 2# x           Supine R UE PNF D1 & D2 3x10 each Red x Added 7/27/23   Suitcase carry 2 laps 20# KB     Arnold press 2x12 7.5# x    Other:    Specific Instructions for next treatment: Follow Dr. Jocelyne Stone SAD protocol progressing as able;      Assessment: [x] Progressing toward goals. Patient continues to demonstrate good tolerance with exercises as charted above. Increased in reps with exercises as charted above. Patient was given green and red theraband at home to continue with exercises on his own. Patient follows up with Dr. Jocelyne Stone on Monday. Will plan to D/C next appt. Patient reports soreness after visit but no increase in pain. [] No change. [] Other:    [x] Patient would continue to benefit from skilled physical therapy services in order to: continue to improve ROM, strength, and function    STG: (to be met in 6 treatments)  ? Pain:3/10 right shoulder pain or less to improve ability to perform daily tasks such as putting away dishes, washing and grooming hair. MET  ? ROM: R shoulder flexion and abduction to [facility] compared to contralateral side to improve functional ability with overhead activity Progress  ?  Function:UEFI to 58% or better to improve ability to perform activities of daily living including cooking, cleaning, and grocery shopping MET  Independent with Home Exercise Programs MET

## 2023-09-18 ENCOUNTER — OFFICE VISIT (OUTPATIENT)
Dept: ORTHOPEDIC SURGERY | Age: 48
End: 2023-09-18

## 2023-09-18 VITALS — RESPIRATION RATE: 14 BRPM | HEIGHT: 70 IN | BODY MASS INDEX: 41.66 KG/M2 | WEIGHT: 291 LBS

## 2023-09-18 DIAGNOSIS — M19.011 OSTEOARTHRITIS OF RIGHT ACROMIOCLAVICULAR JOINT: Primary | ICD-10-CM

## 2023-09-18 PROCEDURE — 99024 POSTOP FOLLOW-UP VISIT: CPT | Performed by: ORTHOPAEDIC SURGERY

## 2023-09-18 NOTE — PROGRESS NOTES
Procedure: Right shoulder arthroscopic distal clavicle excision  Date of procedure: 6/29/2023    HPI:  Shayla Egan is a 49 y.o. male who is approximately 3 months status post aforementioned procedure. He continues to do fairly well at this time. He reports having some mild pain with overuse but otherwise no complaints. Physical examination:  Evaluation of patient's right shoulder and upper extremity demonstrates his incisions to be appropriately healed. Sensation is grossly intact light touch in all dermatomes and he has a 2+ radial pulse with brisk capillary refill in his fingers. He has approximately 150 degrees of shoulder elevation and abduction with 85 degrees of shoulder elevation and internal rotation to L3. He is nontender to palpation at the level of the [facility] [place], operated by [facility] joint. Impression and plan:  Shayla Egan is a 49 y.o. male who is approximately 3 months status post an arthroscopic right shoulder arthroscopic distal clavicle excision. He is doing well at this time. He was encouraged to keep up with his home exercise program and may continue to gradually advance his activity as he feels comfortable. We will see him back in my clinic as needed but he was encouraged to return or call at anytime with any questions or concerns.

## 2023-09-20 ENCOUNTER — HOSPITAL ENCOUNTER (OUTPATIENT)
Dept: PHYSICAL THERAPY | Age: 48
Setting detail: THERAPIES SERIES
Discharge: HOME | End: 2023-09-20
Payer: COMMERCIAL

## 2023-09-20 PROCEDURE — 97110 THERAPEUTIC EXERCISES: CPT

## 2023-09-20 NOTE — PROGRESS NOTES
[x] [facility]) - [facility] & Therapy  [address]  [place]: [phone]   F: [phone]    Physical Therapy Daily Progress and Discharge note      Date:  2023  Patient Name:  Isac Puckett    :  1975  MRN: [id number]  Physician: Brianda Frost MD                                 Insurance: Medical [place] ( Hard Max, No DN)  Medical Diagnosis:   Z98.890 (ICD-10-CM) - Status post subacromial decompression   M25.511 (ICD-10-CM) - Right shoulder pain, unspecified chronicity                             Rehab Codes: M25.511 Right shoulder pain  Onset Date: 23                                 Next 's appt: TBD  Visit# / total visits:   Cancels/No Shows: 0/0    Subjective:    Patient reports that he is feeling good today. He followed up with Dr. Dean Garcia which informed to continue with HEP and advance as he feels comfortable. Pain:  [x] Yes  [] No Location: R Shoulder- incisional near AC Pain Rating: (0-10 scale) tightness/10  Pain altered Tx:  [x] No  [] Yes  Action:    Comments:     Objective:  Modalities:   Game Ready   - Temp: 34 degrees   - Location: Right shoulder  - Position: seated  - Time: 10 minutes   - Pressure level: Low     Precautions: Gradually progress lifting.  Follow Dr. Dean Garcia SAD protocol  Exercises:  Exercise Reps/ Time Weight/ Level Completed  Today Comments   UBE 2'/2' L4 x    Towel slide 20x3\"      Cane flx, abd, ER 15x3\"   Only ER on    Shoulder IR with towel 10x5\"      Pulleys x20ea   Flexion, abd   Wall slide (B) UE's Flexion  2x10 Green   Band at wrists   Wall Post capsule stretch 10x5\"      Brigette Evi 2#  CW/CCW @ 120* Flexion   Wall sleeper stretch 3x30\"   Also instructed how to perform R Side Lying   Doorway Lat stretch 4x10\"      Wall push up + 2x10      Table push up  2x15   Waist  height   T-band ER/IR @ Neutral 3x10 Green     T-band shrug 2x10 Green     Cable column extension 3x10 10# x    Cable column Rows

## 2023-11-28 DIAGNOSIS — M25.511 CHRONIC RIGHT SHOULDER PAIN: ICD-10-CM

## 2023-11-28 DIAGNOSIS — M25.561 CHRONIC PAIN OF RIGHT KNEE: ICD-10-CM

## 2023-11-28 DIAGNOSIS — G89.29 CHRONIC RIGHT SHOULDER PAIN: ICD-10-CM

## 2023-11-28 DIAGNOSIS — G89.29 CHRONIC PAIN OF RIGHT KNEE: ICD-10-CM

## 2023-11-28 RX ORDER — CELECOXIB 100 MG/1
CAPSULE ORAL
Qty: 90 CAPSULE | Refills: 1 | Status: SHIPPED | OUTPATIENT
Start: 2023-11-28

## 2023-11-28 NOTE — TELEPHONE ENCOUNTER
Last visit: 8/8/23  Last Med refill: 5/25/23  Does patient have enough medication for 72 hours: No:     Next Visit Date:  Future Appointments   Date Time Provider [address]   1/29/2024  4:30 PM Sanam Holloway MD [address] Maintenance   Topic Date Due    Hepatitis B vaccine (1 of 3 - 3-dose series) Never done    A1C test (Diabetic or Prediabetic)  04/23/2023    Flu vaccine (1) 08/01/2023    COVID-19 Vaccine (5 - 2023-24 season) 09/01/2023    Depression Screen  01/26/2024    Colorectal Cancer Screen  02/10/2026    Lipids  04/23/2027    DTaP/Tdap/Td vaccine (3 - Td or Tdap) 09/28/2029    Hepatitis A vaccine  Aged Out    Hib vaccine  Aged Out    Meningococcal (ACWY) vaccine  Aged Out    Pneumococcal 0-64 years Vaccine  Aged Out    Depression Monitoring  Discontinued    Diabetes screen  Discontinued    Hepatitis C screen  Discontinued    HIV screen  Discontinued       Hemoglobin A1C (%)   Date Value   04/23/2022 5.7             ( goal A1C is < 7)   No components found for: \"LABMICR\"  LDL Cholesterol (mg/dL)   Date Value   04/23/2022 91   09/05/2017 100       (goal LDL is <100)   AST (U/L)   Date Value   04/23/2022 28     ALT (U/L)   Date Value   04/23/2022 33     BUN (mg/dL)   Date Value   06/19/2023 12     BP Readings from Last 3 Encounters:   07/26/23 130/80   06/29/23 (!) 108/58   06/19/23 115/60          (goal 120/80)    All Future Testing planned in [facility]  Lab Frequency Next Occurrence   CBC Once 30/77/7942   Basic Metabolic Panel Once 65/77/1800               Patient Active Problem List:     Nodule of tendon sheath     Class 3 severe obesity with serious comorbidity and body mass index (BMI) of 40.0 to 44.9 in adult [facility])     Chronic right shoulder pain     Contracture of muscle of left lower extremity     Acute post-operative pain     Left leg pain     Calcific tendonitis of foot, left     Contracture of left ankle     Calcaneal spur of left foot     Heel pain, chronic,

## 2024-01-29 ENCOUNTER — OFFICE VISIT (OUTPATIENT)
Dept: FAMILY MEDICINE CLINIC | Age: 49
End: 2024-01-29
Payer: COMMERCIAL

## 2024-01-29 VITALS
WEIGHT: 309.6 LBS | TEMPERATURE: 98.8 F | DIASTOLIC BLOOD PRESSURE: 84 MMHG | BODY MASS INDEX: 44.42 KG/M2 | HEART RATE: 72 BPM | SYSTOLIC BLOOD PRESSURE: 120 MMHG | OXYGEN SATURATION: 97 %

## 2024-01-29 DIAGNOSIS — R73.03 PREDIABETES: ICD-10-CM

## 2024-01-29 DIAGNOSIS — Z98.84 HISTORY OF ROUX-EN-Y GASTRIC BYPASS: ICD-10-CM

## 2024-01-29 DIAGNOSIS — E66.01 CLASS 3 SEVERE OBESITY WITH SERIOUS COMORBIDITY AND BODY MASS INDEX (BMI) OF 40.0 TO 44.9 IN ADULT, UNSPECIFIED OBESITY TYPE (HCC): Primary | ICD-10-CM

## 2024-01-29 DIAGNOSIS — M25.561 CHRONIC PAIN OF RIGHT KNEE: ICD-10-CM

## 2024-01-29 DIAGNOSIS — M25.511 CHRONIC RIGHT SHOULDER PAIN: ICD-10-CM

## 2024-01-29 DIAGNOSIS — G89.29 CHRONIC RIGHT SHOULDER PAIN: ICD-10-CM

## 2024-01-29 DIAGNOSIS — S89.91XA RIGHT KNEE INJURY, INITIAL ENCOUNTER: ICD-10-CM

## 2024-01-29 DIAGNOSIS — G89.29 CHRONIC PAIN OF RIGHT KNEE: ICD-10-CM

## 2024-01-29 LAB — HBA1C MFR BLD: 5.6 %

## 2024-01-29 PROCEDURE — 83036 HEMOGLOBIN GLYCOSYLATED A1C: CPT | Performed by: INTERNAL MEDICINE

## 2024-01-29 PROCEDURE — 99214 OFFICE O/P EST MOD 30 MIN: CPT | Performed by: INTERNAL MEDICINE

## 2024-01-29 RX ORDER — CELECOXIB 100 MG/1
100 CAPSULE ORAL DAILY
Qty: 90 CAPSULE | Refills: 1 | Status: SHIPPED | OUTPATIENT
Start: 2024-01-29 | End: 2024-01-29

## 2024-01-29 RX ORDER — CELECOXIB 200 MG/1
200 CAPSULE ORAL DAILY
Qty: 90 CAPSULE | Refills: 1 | Status: SHIPPED | OUTPATIENT
Start: 2024-01-29

## 2024-01-29 RX ORDER — CHLORHEXIDINE GLUCONATE ORAL RINSE 1.2 MG/ML
15 SOLUTION DENTAL 2 TIMES DAILY
COMMUNITY
Start: 2023-11-16

## 2024-01-29 ASSESSMENT — PATIENT HEALTH QUESTIONNAIRE - PHQ9
SUM OF ALL RESPONSES TO PHQ QUESTIONS 1-9: 0
2. FEELING DOWN, DEPRESSED OR HOPELESS: 0
SUM OF ALL RESPONSES TO PHQ9 QUESTIONS 1 & 2: 0
1. LITTLE INTEREST OR PLEASURE IN DOING THINGS: 0
SUM OF ALL RESPONSES TO PHQ QUESTIONS 1-9: 0

## 2024-01-29 NOTE — PROGRESS NOTES
[facility]  [facility]  [address] [place] [address]  Dept: [phone]  Dept Fax: [phone]      Jarrod Villa is a 48 y.o. male who presents today for hismedical conditions/complaints as noted below.  Jarrod Villa is c/o of Chronic Pain (F/u) and Weight Management (Discuss weight loss options, has tried dieting and exercising, loses but then gains it back, has spoke with nutritionist )        Assessment/Plan:     1. Class 3 severe obesity with serious comorbidity and body mass index (BMI) of 40.0 to 44.9 in adult, unspecified obesity type (HCC)  2. Chronic right shoulder pain  -     celecoxib (CELEBREX) 200 MG capsule; Take 1 capsule by mouth daily, Disp-90 capsule, R-1Normal  3. Chronic pain of right knee  -     celecoxib (CELEBREX) 200 MG capsule; Take 1 capsule by mouth daily, Disp-90 capsule, R-1Normal  4. Prediabetes  -     POCT glycosylated hemoglobin (Hb A1C)  5. 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 Right knee injury, initial encounter          No follow-ups on file.      HPI     Shoulder is better, pain well controlled with celebrex   He would like to work on losing weight - he has had gastric bypass surgery in the past. Trying to eat healthier. Keto diet worked for a little bit, then got hard to stick to.  Has not been able to make it back to the gym     Weight Management  Pertinent negatives include no abdominal pain, chest pain, coughing, fatigue, fever, joint swelling, myalgias, nausea or vomiting.       BP Readings from Last 3 Encounters:   01/29/24 120/84   07/26/23 130/80   06/29/23 (!) 108/58              Past Medical History:   Diagnosis Date    Knee pain, right     Shoulder pain, right       Past Surgical History:   Procedure Laterality Date    ACHILLES TENDON SURGERY Left 12/3/2021    LEFT ACHILLES REPAIR 2 DEGREE     LEFT CALCANEAL SPUR RESECTION          LEFT FHL TENDON TRANSFER       LEFT GASTROC RECESSION   - ARTHREX    DEBORA performed by

## 2024-02-05 ASSESSMENT — ENCOUNTER SYMPTOMS
NAUSEA: 0
ABDOMINAL PAIN: 0
CHOKING: 0
CONSTIPATION: 0
DIARRHEA: 0
BLOOD IN STOOL: 0
WHEEZING: 0
COUGH: 0
ANAL BLEEDING: 0
SHORTNESS OF BREATH: 0
CHEST TIGHTNESS: 0
VOMITING: 0

## 2024-02-05 ASSESSMENT — VISUAL ACUITY: OU: 1

## 2024-07-26 ENCOUNTER — TELEPHONE (OUTPATIENT)
Dept: FAMILY MEDICINE CLINIC | Age: 49
End: 2024-07-26

## 2024-07-26 NOTE — TELEPHONE ENCOUNTER
----- Message from Ashish Pierson sent at 7/25/2024  9:43 AM EDT -----  Regarding: [facility] Appointment Request  E[facility] Appointment Request    Patient needs appointment for E[facility] Appointment Type: Existing Condition Follow Up.    Patient Requested Dates(s):     As soon as possible  Patient Requested Time:           A morning schedule   Provider Name:                          Dr. Millie Escudero MD    Reason for Appointment Request: Established Patient - Available appointments did not meet patient need  --------------------------------------------------------------------------------------------------------------------------    Relationship to Patient: Self     Call Back Information: OK to leave message on voicemail  Preferred Call Back Number: [phone]

## 2024-07-31 DIAGNOSIS — G89.29 CHRONIC RIGHT SHOULDER PAIN: ICD-10-CM

## 2024-07-31 DIAGNOSIS — G89.29 CHRONIC PAIN OF RIGHT KNEE: ICD-10-CM

## 2024-07-31 DIAGNOSIS — M25.511 CHRONIC RIGHT SHOULDER PAIN: ICD-10-CM

## 2024-07-31 DIAGNOSIS — M25.561 CHRONIC PAIN OF RIGHT KNEE: ICD-10-CM

## 2024-07-31 RX ORDER — CELECOXIB 200 MG/1
CAPSULE ORAL DAILY
Qty: 90 CAPSULE | Refills: 1 | Status: SHIPPED | OUTPATIENT
Start: 2024-07-31

## 2024-07-31 NOTE — TELEPHONE ENCOUNTER
Last visit: 01/29/2024  Last Med refill: 05/03/2024  Does patient have enough medication for 72 hours: No:     Next Visit Date:  Future Appointments   Date Time Provider Department Center   9/11/2024 11:00 AM Millie Escudero MD [id number]       Health Maintenance   Topic Date Due    Hepatitis B vaccine (1 of 3 - 3-dose series) Never done    COVID-19 Vaccine (5 - 2023-24 season) 09/01/2023    Flu vaccine (1) 08/01/2024    Depression Screen  01/29/2025    Colorectal Cancer Screen  02/10/2026    Diabetes screen  01/29/2027    Lipids  04/23/2027    DTaP/Tdap/Td vaccine (3 - Td or Tdap) 09/28/2029    Hepatitis A vaccine  Aged Out    Hib vaccine  Aged Out    Polio vaccine  Aged Out    Meningococcal (ACWY) vaccine  Aged Out    Pneumococcal 0-64 years Vaccine  Aged Out    A1C test (Diabetic or Prediabetic)  Discontinued    Depression Monitoring  Discontinued    Hepatitis C screen  Discontinued    HIV screen  Discontinued       Hemoglobin A1C (%)   Date Value   01/29/2024 5.6   04/23/2022 5.7             ( goal A1C is < 7)   No components found for: \"LABMICR\"  No components found for: \"LDLCHOLESTEROL\", \"LDLCALC\"    (goal LDL is <100)   AST (U/L)   Date Value   04/23/2022 28     ALT (U/L)   Date Value   04/23/2022 33     BUN (mg/dL)   Date Value   06/19/2023 12     BP Readings from Last 3 Encounters:   01/29/24 120/84   07/26/23 130/80   06/29/23 (!) 108/58          (goal 120/80)    All Future Testing planned in [facility]  Lab Frequency Next Occurrence               Patient Active Problem List:     Nodule of tendon sheath     Class 3 severe obesity with serious comorbidity and body mass index (BMI) of 40.0 to 44.9 in adult (HCC)     Chronic right shoulder pain     Contracture of muscle of left lower extremity     Acute post-operative pain     Left leg pain     Calcific tendonitis of foot, left     Contracture of left ankle     Calcaneal spur of left foot     Heel pain, chronic, left     History of Stephanie-en-Y

## 2024-09-11 ENCOUNTER — OFFICE VISIT (OUTPATIENT)
Dept: FAMILY MEDICINE CLINIC | Age: 49
End: 2024-09-11

## 2024-09-11 VITALS
TEMPERATURE: 98.2 F | SYSTOLIC BLOOD PRESSURE: 102 MMHG | BODY MASS INDEX: 41.78 KG/M2 | DIASTOLIC BLOOD PRESSURE: 68 MMHG | OXYGEN SATURATION: 98 % | WEIGHT: 291.2 LBS | HEART RATE: 70 BPM

## 2024-09-11 DIAGNOSIS — Z12.5 ENCOUNTER FOR SCREENING FOR MALIGNANT NEOPLASM OF PROSTATE: ICD-10-CM

## 2024-09-11 DIAGNOSIS — Z13.29 SCREENING FOR THYROID DISORDER: ICD-10-CM

## 2024-09-11 DIAGNOSIS — M72.2 PLANTAR FASCIITIS OF RIGHT FOOT: Primary | ICD-10-CM

## 2024-09-11 DIAGNOSIS — E66.01 CLASS 3 SEVERE OBESITY WITH SERIOUS COMORBIDITY AND BODY MASS INDEX (BMI) OF 40.0 TO 44.9 IN ADULT, UNSPECIFIED OBESITY TYPE (HCC): ICD-10-CM

## 2024-09-11 DIAGNOSIS — Z13.220 SCREENING, LIPID: ICD-10-CM

## 2024-09-11 DIAGNOSIS — M79.671 PAIN OF RIGHT HEEL: ICD-10-CM

## 2024-09-11 DIAGNOSIS — Z23 NEED FOR IMMUNIZATION AGAINST INFLUENZA: ICD-10-CM

## 2024-09-11 DIAGNOSIS — Z13.0 SCREENING, ANEMIA, DEFICIENCY, IRON: ICD-10-CM

## 2024-09-11 SDOH — ECONOMIC STABILITY: INCOME INSECURITY: HOW HARD IS IT FOR YOU TO PAY FOR THE VERY BASICS LIKE FOOD, HOUSING, MEDICAL CARE, AND HEATING?: NOT HARD AT ALL

## 2024-09-11 SDOH — ECONOMIC STABILITY: FOOD INSECURITY: WITHIN THE PAST 12 MONTHS, THE FOOD YOU BOUGHT JUST DIDN'T LAST AND YOU DIDN'T HAVE MONEY TO GET MORE.: NEVER TRUE

## 2024-09-11 SDOH — ECONOMIC STABILITY: FOOD INSECURITY: WITHIN THE PAST 12 MONTHS, YOU WORRIED THAT YOUR FOOD WOULD RUN OUT BEFORE YOU GOT MONEY TO BUY MORE.: NEVER TRUE

## 2024-09-11 SDOH — ECONOMIC STABILITY: TRANSPORTATION INSECURITY
IN THE PAST 12 MONTHS, HAS LACK OF TRANSPORTATION KEPT YOU FROM MEETINGS, WORK, OR FROM GETTING THINGS NEEDED FOR DAILY LIVING?: NO

## 2024-09-16 ENCOUNTER — HOSPITAL ENCOUNTER (OUTPATIENT)
Age: 49
Setting detail: SPECIMEN
Discharge: HOME | End: 2024-09-16

## 2024-09-16 DIAGNOSIS — Z13.0 SCREENING, ANEMIA, DEFICIENCY, IRON: ICD-10-CM

## 2024-09-16 DIAGNOSIS — Z13.29 SCREENING FOR THYROID DISORDER: ICD-10-CM

## 2024-09-16 DIAGNOSIS — Z12.5 ENCOUNTER FOR SCREENING FOR MALIGNANT NEOPLASM OF PROSTATE: ICD-10-CM

## 2024-09-16 DIAGNOSIS — Z13.220 SCREENING, LIPID: ICD-10-CM

## 2024-09-16 LAB
ALBUMIN SERPL-MCNC: 4.2 G/DL (ref 3.5–5.2)
ALBUMIN/GLOB SERPL: 2 {RATIO} (ref 1–2.5)
ALP SERPL-CCNC: 71 U/L (ref 40–129)
ALT SERPL-CCNC: 23 U/L (ref 10–50)
ANION GAP SERPL CALCULATED.3IONS-SCNC: 9 MMOL/L (ref 9–16)
AST SERPL-CCNC: 27 U/L (ref 10–50)
BASOPHILS # BLD: <0.03 K/UL (ref 0–0.2)
BASOPHILS NFR BLD: 0 % (ref 0–2)
BILIRUB SERPL-MCNC: 0.5 MG/DL (ref 0–1.2)
BUN SERPL-MCNC: 15 MG/DL (ref 6–20)
CALCIUM SERPL-MCNC: 9.1 MG/DL (ref 8.6–10.4)
CHLORIDE SERPL-SCNC: 108 MMOL/L (ref 98–107)
CHOLEST SERPL-MCNC: 206 MG/DL (ref 0–199)
CHOLESTEROL/HDL RATIO: 4
CO2 SERPL-SCNC: 24 MMOL/L (ref 20–31)
CREAT SERPL-MCNC: 0.9 MG/DL (ref 0.7–1.2)
EOSINOPHIL # BLD: 0.07 K/UL (ref 0–0.44)
EOSINOPHILS RELATIVE PERCENT: 1 % (ref 1–4)
ERYTHROCYTE [DISTWIDTH] IN BLOOD BY AUTOMATED COUNT: 13.4 % (ref 11.8–14.4)
GFR, ESTIMATED: >90 ML/MIN/1.73M2
GLUCOSE SERPL-MCNC: 109 MG/DL (ref 74–99)
HCT VFR BLD AUTO: 42.3 % (ref 40.7–50.3)
HDLC SERPL-MCNC: 50 MG/DL
HGB BLD-MCNC: 13.7 G/DL (ref 13–17)
IMM GRANULOCYTES # BLD AUTO: 0.04 K/UL (ref 0–0.3)
IMM GRANULOCYTES NFR BLD: 1 %
LDLC SERPL CALC-MCNC: 136 MG/DL (ref 0–100)
LYMPHOCYTES NFR BLD: 1.44 K/UL (ref 1.1–3.7)
LYMPHOCYTES RELATIVE PERCENT: 25 % (ref 24–43)
MCH RBC QN AUTO: 30.6 PG (ref 25.2–33.5)
MCHC RBC AUTO-ENTMCNC: 32.4 G/DL (ref 28.4–34.8)
MCV RBC AUTO: 94.6 FL (ref 82.6–102.9)
MONOCYTES NFR BLD: 0.49 K/UL (ref 0.1–1.2)
MONOCYTES NFR BLD: 9 % (ref 3–12)
NEUTROPHILS NFR BLD: 64 % (ref 36–65)
NEUTS SEG NFR BLD: 3.65 K/UL (ref 1.5–8.1)
NRBC BLD-RTO: 0 PER 100 WBC
PLATELET # BLD AUTO: 255 K/UL (ref 138–453)
PMV BLD AUTO: 10 FL (ref 8.1–13.5)
POTASSIUM SERPL-SCNC: 4.6 MMOL/L (ref 3.7–5.3)
PROT SERPL-MCNC: 6.9 G/DL (ref 6.6–8.7)
PSA SERPL-MCNC: 1 NG/ML (ref 0–4)
RBC # BLD AUTO: 4.47 M/UL (ref 4.21–5.77)
SODIUM SERPL-SCNC: 141 MMOL/L (ref 136–145)
TRIGL SERPL-MCNC: 97 MG/DL (ref 0–149)
TSH SERPL DL<=0.05 MIU/L-ACNC: 1.9 UIU/ML (ref 0.27–4.2)
VLDLC SERPL CALC-MCNC: 19 MG/DL
WBC OTHER # BLD: 5.7 K/UL (ref 3.5–11.3)

## 2024-09-17 ENCOUNTER — PATIENT MESSAGE (OUTPATIENT)
Dept: FAMILY MEDICINE CLINIC | Age: 49
End: 2024-09-17

## 2024-09-17 ASSESSMENT — VISUAL ACUITY: OU: 1

## 2024-09-24 DIAGNOSIS — E66.01 CLASS 3 SEVERE OBESITY WITH SERIOUS COMORBIDITY AND BODY MASS INDEX (BMI) OF 40.0 TO 44.9 IN ADULT, UNSPECIFIED OBESITY TYPE: Primary | ICD-10-CM

## 2024-09-24 RX ORDER — TIRZEPATIDE 2.5 MG/.5ML
INJECTION, SOLUTION SUBCUTANEOUS
Qty: 6 ML | Refills: 0 | Status: SHIPPED | OUTPATIENT
Start: 2024-09-24 | End: 2024-11-19

## 2024-09-26 ENCOUNTER — OFFICE VISIT (OUTPATIENT)
Age: 49
End: 2024-09-26
Payer: COMMERCIAL

## 2024-09-26 VITALS — BODY MASS INDEX: 41.52 KG/M2 | HEIGHT: 70 IN | WEIGHT: 290 LBS

## 2024-09-26 DIAGNOSIS — M77.8 ENTHESOPATHY OF RIGHT FOOT: Primary | ICD-10-CM

## 2024-09-26 DIAGNOSIS — M76.61 ACHILLES TENDINITIS OF RIGHT LOWER EXTREMITY: ICD-10-CM

## 2024-09-26 DIAGNOSIS — M79.671 PAIN IN RIGHT FOOT: ICD-10-CM

## 2024-09-26 DIAGNOSIS — M24.571 EQUINUS CONTRACTURE OF RIGHT ANKLE: ICD-10-CM

## 2024-09-26 PROCEDURE — 99203 OFFICE O/P NEW LOW 30 MIN: CPT | Performed by: PODIATRIST

## 2024-09-26 ASSESSMENT — ENCOUNTER SYMPTOMS
COLOR CHANGE: 0
NAUSEA: 0
SHORTNESS OF BREATH: 0
BACK PAIN: 0
DIARRHEA: 0

## 2024-10-05 ENCOUNTER — PATIENT MESSAGE (OUTPATIENT)
Dept: FAMILY MEDICINE CLINIC | Age: 49
End: 2024-10-05

## 2024-10-05 DIAGNOSIS — E66.01 CLASS 3 SEVERE OBESITY WITH SERIOUS COMORBIDITY AND BODY MASS INDEX (BMI) OF 40.0 TO 44.9 IN ADULT, UNSPECIFIED OBESITY TYPE: Primary | ICD-10-CM

## 2024-10-05 DIAGNOSIS — E66.813 CLASS 3 SEVERE OBESITY WITH SERIOUS COMORBIDITY AND BODY MASS INDEX (BMI) OF 40.0 TO 44.9 IN ADULT, UNSPECIFIED OBESITY TYPE: Primary | ICD-10-CM

## 2024-10-08 RX ORDER — SEMAGLUTIDE 0.68 MG/ML
0.25 INJECTION, SOLUTION SUBCUTANEOUS WEEKLY
Qty: 3 ML | Refills: 0 | Status: SHIPPED | OUTPATIENT
Start: 2024-10-08

## 2024-11-27 ENCOUNTER — TELEPHONE (OUTPATIENT)
Dept: FAMILY MEDICINE CLINIC | Age: 49
End: 2024-11-27

## 2024-11-27 NOTE — TELEPHONE ENCOUNTER
[facility] has denied all weight loss medications, Mounjaro, Wegovy and Zepbound.  Denial for the Zepbound is scanned into media    Patient will be notified

## 2024-11-29 NOTE — TELEPHONE ENCOUNTER
Noted - he may need to see weight management to get them approved - or try again after the first of the year

## 2025-01-05 ASSESSMENT — PATIENT HEALTH QUESTIONNAIRE - PHQ9
1. LITTLE INTEREST OR PLEASURE IN DOING THINGS: SEVERAL DAYS
SUM OF ALL RESPONSES TO PHQ QUESTIONS 1-9: 2
SUM OF ALL RESPONSES TO PHQ9 QUESTIONS 1 & 2: 2
SUM OF ALL RESPONSES TO PHQ QUESTIONS 1-9: 2
SUM OF ALL RESPONSES TO PHQ QUESTIONS 1-9: 2
SUM OF ALL RESPONSES TO PHQ9 QUESTIONS 1 & 2: 2
2. FEELING DOWN, DEPRESSED OR HOPELESS: SEVERAL DAYS
2. FEELING DOWN, DEPRESSED OR HOPELESS: SEVERAL DAYS
SUM OF ALL RESPONSES TO PHQ QUESTIONS 1-9: 2
1. LITTLE INTEREST OR PLEASURE IN DOING THINGS: SEVERAL DAYS

## 2025-01-08 ENCOUNTER — OFFICE VISIT (OUTPATIENT)
Dept: FAMILY MEDICINE CLINIC | Age: 50
End: 2025-01-08
Payer: COMMERCIAL

## 2025-01-08 VITALS
BODY MASS INDEX: 40.89 KG/M2 | OXYGEN SATURATION: 96 % | SYSTOLIC BLOOD PRESSURE: 120 MMHG | TEMPERATURE: 97.2 F | DIASTOLIC BLOOD PRESSURE: 76 MMHG | HEART RATE: 74 BPM | WEIGHT: 285 LBS

## 2025-01-08 DIAGNOSIS — E66.813 CLASS 3 SEVERE OBESITY DUE TO EXCESS CALORIES WITH SERIOUS COMORBIDITY AND BODY MASS INDEX (BMI) OF 40.0 TO 44.9 IN ADULT: Primary | ICD-10-CM

## 2025-01-08 DIAGNOSIS — Z98.84 HISTORY OF ROUX-EN-Y GASTRIC BYPASS: ICD-10-CM

## 2025-01-08 DIAGNOSIS — E66.01 CLASS 3 SEVERE OBESITY DUE TO EXCESS CALORIES WITH SERIOUS COMORBIDITY AND BODY MASS INDEX (BMI) OF 40.0 TO 44.9 IN ADULT: Primary | ICD-10-CM

## 2025-01-08 PROCEDURE — 99213 OFFICE O/P EST LOW 20 MIN: CPT | Performed by: INTERNAL MEDICINE

## 2025-01-08 SDOH — ECONOMIC STABILITY: FOOD INSECURITY: WITHIN THE PAST 12 MONTHS, YOU WORRIED THAT YOUR FOOD WOULD RUN OUT BEFORE YOU GOT MONEY TO BUY MORE.: NEVER TRUE

## 2025-01-08 SDOH — ECONOMIC STABILITY: FOOD INSECURITY: WITHIN THE PAST 12 MONTHS, THE FOOD YOU BOUGHT JUST DIDN'T LAST AND YOU DIDN'T HAVE MONEY TO GET MORE.: NEVER TRUE

## 2025-01-08 ASSESSMENT — ENCOUNTER SYMPTOMS
CHEST TIGHTNESS: 0
DIARRHEA: 0
ABDOMINAL PAIN: 0
CHOKING: 0
NAUSEA: 0
ANAL BLEEDING: 0
COUGH: 0
BLOOD IN STOOL: 0
WHEEZING: 0
SHORTNESS OF BREATH: 0
VOMITING: 0
CONSTIPATION: 0

## 2025-01-08 ASSESSMENT — VISUAL ACUITY: OU: 1

## 2025-01-08 NOTE — PROGRESS NOTES
[facility]  [place] [address]  Dept: [phone]  Dept Fax: [phone]      Jarrod Villa is a 49 y.o. male who presents today for hismedical conditions/complaints as noted below.  Jarrod Villa is c/o of No chief complaint on file.        Assessment/Plan:     1. Class 3 severe obesity due to excess calories with serious comorbidity and body mass index (BMI) of 40.0 to 44.9 in adult  2. 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 Patient's comorbid health conditions associated with elevated BMI were discussed, as well as the likely benefits of weight loss. Based upon patient's motivation to change his behavior, the following plan was agreed upon to work toward a weight loss goal of 15 pounds: lower carbohydrate diet, at least 150 minutes of exercise/week, and increase physical activity, as tolerated. Educational materials for weight loss were provided.  Patient will follow-up in 6 month(s) with PCP. Time spent counseling patient: 20 minutes            No follow-ups on file.      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, so he can have more time at home to work out and take care of himself   Working on finding a gym closer to home   Has lost 24 lbs since beginning of last year with diet and lifestyle changes         BP Readings from Last 3 Encounters:   01/08/25 120/76   09/11/24 102/68   01/29/24 120/84              Past Medical History:   Diagnosis Date    Knee pain, right     Shoulder pain, right       Past Surgical History:   Procedure Laterality Date    ACHILLES TENDON SURGERY Left 12/3/2021    LEFT ACHILLES REPAIR 2 DEGREE     LEFT CALCANEAL SPUR RESECTION          LEFT FHL TENDON TRANSFER       LEFT GASTROC

## 2025-05-28 ENCOUNTER — OFFICE VISIT (OUTPATIENT)
Dept: FAMILY MEDICINE CLINIC | Age: 50
End: 2025-05-28
Payer: COMMERCIAL

## 2025-05-28 VITALS
SYSTOLIC BLOOD PRESSURE: 104 MMHG | OXYGEN SATURATION: 98 % | BODY MASS INDEX: 38.43 KG/M2 | DIASTOLIC BLOOD PRESSURE: 60 MMHG | WEIGHT: 267.8 LBS | HEART RATE: 88 BPM | TEMPERATURE: 98.3 F

## 2025-05-28 DIAGNOSIS — K64.4 EXTERNAL HEMORRHOIDS: Primary | ICD-10-CM

## 2025-05-28 PROCEDURE — 99213 OFFICE O/P EST LOW 20 MIN: CPT | Performed by: STUDENT IN AN ORGANIZED HEALTH CARE EDUCATION/TRAINING PROGRAM

## 2025-05-28 RX ORDER — HYDROCORTISONE ACETATE 25 MG/1
25 SUPPOSITORY RECTAL EVERY 12 HOURS
Qty: 14 SUPPOSITORY | Refills: 0 | Status: SHIPPED | OUTPATIENT
Start: 2025-05-28 | End: 2025-06-04

## 2025-05-28 ASSESSMENT — ENCOUNTER SYMPTOMS: RECTAL PAIN: 1

## 2025-05-28 NOTE — PROGRESS NOTES
Jarrod Villa (:  1975) is a 49 y.o. male,Established patient, here for evaluation of the following chief complaint(s):  Hemorrhoids (Started about 2 weeks ago, tried OTC wipes and cream no relief, no blood in stool or toilet, might have seen some blood on tissue this morning, having itching and burning, feels lump, very painful)         Assessment & Plan  External hemorrhoids   External hemorrhoid with symptoms of irritation and mild tenderness   Ongoing for 2 weeks now s/p trial of OTC wipes and cream without any success   Denies any issues with constipation or straining   PE: external hemorrhoids noted on physical exam, with mild tenderness (no blood noted)   Discussed sitz bath and will prescribe anusol cream  Due to patient being symptomatic will send in referral for colorectal surgery    Orders:    Chente Stanton MD, Colorectal Surgery, [place]      No follow-ups on file.       Subjective   49 yrold couple of weeks tried preperation wipes and is itchy and burning sensation, the longer the day goes on the worse it gets. No blood in stool, but noticed it when wiping. Does not have history of constipation and does not strain when he uses the bathroom         Review of Systems   Gastrointestinal:  Positive for rectal pain.        Mass of outer anus           Objective   Physical Exam  Vitals reviewed. Exam conducted with a chaperone present.   Constitutional:       Appearance: Normal appearance.   Genitourinary:     Rectum: External hemorrhoid present.   Neurological:      Mental Status: He is alert.                  An electronic signature was used to authenticate this note.    --Bayron Lucia MD

## 2025-07-08 ENCOUNTER — OFFICE VISIT (OUTPATIENT)
Dept: FAMILY MEDICINE CLINIC | Age: 50
End: 2025-07-08
Payer: COMMERCIAL

## 2025-07-08 VITALS
HEART RATE: 54 BPM | SYSTOLIC BLOOD PRESSURE: 104 MMHG | OXYGEN SATURATION: 97 % | DIASTOLIC BLOOD PRESSURE: 70 MMHG | WEIGHT: 268.4 LBS | BODY MASS INDEX: 38.51 KG/M2

## 2025-07-08 DIAGNOSIS — Z12.5 ENCOUNTER FOR SCREENING FOR MALIGNANT NEOPLASM OF PROSTATE: ICD-10-CM

## 2025-07-08 DIAGNOSIS — E78.00 PURE HYPERCHOLESTEROLEMIA: ICD-10-CM

## 2025-07-08 DIAGNOSIS — Z13.29 SCREENING FOR THYROID DISORDER: ICD-10-CM

## 2025-07-08 DIAGNOSIS — Z98.84 HISTORY OF ROUX-EN-Y GASTRIC BYPASS: ICD-10-CM

## 2025-07-08 DIAGNOSIS — E66.812 CLASS 2 SEVERE OBESITY DUE TO EXCESS CALORIES WITH SERIOUS COMORBIDITY AND BODY MASS INDEX (BMI) OF 38.0 TO 38.9 IN ADULT (HCC): ICD-10-CM

## 2025-07-08 DIAGNOSIS — L98.7 LOOSE SKIN: Primary | ICD-10-CM

## 2025-07-08 DIAGNOSIS — E66.01 CLASS 2 SEVERE OBESITY DUE TO EXCESS CALORIES WITH SERIOUS COMORBIDITY AND BODY MASS INDEX (BMI) OF 38.0 TO 38.9 IN ADULT (HCC): ICD-10-CM

## 2025-07-08 DIAGNOSIS — E55.9 VITAMIN D DEFICIENCY: ICD-10-CM

## 2025-07-08 DIAGNOSIS — Z13.0 SCREENING, ANEMIA, DEFICIENCY, IRON: ICD-10-CM

## 2025-07-08 PROCEDURE — 99213 OFFICE O/P EST LOW 20 MIN: CPT | Performed by: INTERNAL MEDICINE

## 2025-07-08 RX ORDER — CHLORAL HYDRATE 500 MG
CAPSULE ORAL DAILY
COMMUNITY

## 2025-07-08 NOTE — PROGRESS NOTES
[facility]  [facility]  [address] [place] [id number]  Dept: [phone]  Dept Fax: [phone]      Jarrod Villa is a 49 y.o. male who presents today for hismedical conditions/complaints as noted below.  Jarrod Villa is c/o of 6 Month Follow-Up, Medication Check, and Weight Management (Discuss weight loss skin, )        Assessment/Plan:     1. Loose skin  -     External Referral To Plastic Surgery  2. 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 Class 2 severe obesity due to excess calories with serious comorbidity and body mass index (BMI) of 38.0 to 38.9 in adult (HCC)  4. Pure hypercholesterolemia  -     Comprehensive Metabolic Panel; Future  -     Lipid, Fasting; Future  5. Encounter for screening for malignant neoplasm of prostate  -     PSA Screening; Future  6. Screening, anemia, deficiency, iron  -     CBC with Auto Differential; Future  -     Comprehensive Metabolic Panel; Future  7. Screening for thyroid disorder  -     TSH reflex to FT4; Future  8. Vitamin D deficiency  -     Vitamin D 25 Hydroxy; Future          No follow-ups on file.      HPI     Having issues with apron belly getting in the way of everything   Has been off GLP1 since December, maintaiing weight loss   Going to the gym and eating low carb diet       Weight Management  Pertinent negatives include no abdominal pain, chest pain, coughing, fatigue, fever, joint swelling, myalgias, nausea or vomiting.       BP Readings from Last 3 Encounters:   07/08/25 104/70   05/28/25 104/60   01/08/25 120/76              Past Medical History:   Diagnosis Date    Knee pain, right     Shoulder pain, right       Past Surgical History:   Procedure Laterality Date    ACHILLES TENDON SURGERY Left 12/3/2021    LEFT ACHILLES REPAIR 2 DEGREE     LEFT CALCANEAL SPUR RESECTION          LEFT FHL TENDON TRANSFER       LEFT GASTROC RECESSION   - ARTHREX    DEBORA performed by Bryson Robison DPM at [facility]    ANKLE SURGERY

## (undated) DEVICE — DRAPE,REIN 53X77,STERILE: Brand: MEDLINE

## (undated) DEVICE — YANKAUER,FLEXIBLE HANDLE,REGLR CAPACITY: Brand: MEDLINE INDUSTRIES, INC.

## (undated) DEVICE — BLADE SHV L13CM DIA4MM BNE CUT AGG DEB COOLCUT

## (undated) DEVICE — COVER,MAYO STAND,STERILE: Brand: MEDLINE

## (undated) DEVICE — GLOVE SURG SZ 8 CRM LTX FREE POLYISOPRENE POLYMER BEAD ANTI

## (undated) DEVICE — SOLUTION IRRIG 1000ML 0.9% SOD CHL USP POUR PLAS BTL

## (undated) DEVICE — SOLUTION IV IRRIG LACTATED RINGERS 3000ML 2B7487

## (undated) DEVICE — KIT POS ULT SHLDR PT CARE REHAB SLNG

## (undated) DEVICE — SOLUTION IV IRRIG 500ML 0.9% SODIUM CHL 2F7123

## (undated) DEVICE — SINGLE PORT MANIFOLD: Brand: NEPTUNE 2

## (undated) DEVICE — TUBING PMP L16FT MAIN DISP FOR AR-6400 AR-6475

## (undated) DEVICE — Device

## (undated) DEVICE — KIT CHAIR TRIMANO FOAM W/ SUPP ARM DRP ERGONOMICALLY DESIGNED

## (undated) DEVICE — SMALL TEAR CROSS CUT RASP (11.0 X 5.0MM)

## (undated) DEVICE — APPLICATOR MEDICATED 26 CC SOLUTION HI LT ORNG CHLORAPREP

## (undated) DEVICE — SUTURE MCRYL SZ 2-0 L27IN ABSRB VLT SH L26MM 1/2 CIR TAPR Y317H

## (undated) DEVICE — SHEET, ORTHO, SPLIT, STERILE: Brand: MEDLINE

## (undated) DEVICE — GLOVE ORANGE PI 7   MSG9070

## (undated) DEVICE — DRESSING TRNSPAR W5XL4.5IN FLM SHT SEMIPERMEABLE WIND

## (undated) DEVICE — GLOVE SURG SZ 75 CRM LTX FREE POLYISOPRENE POLYMER BEAD ANTI

## (undated) DEVICE — DISC ABSRB YEL FLR POLYETH MGMT SUCT QUICKSUITE

## (undated) DEVICE — BLANKET WRM W40.2XL55.9IN IORT LO BODY + MISTRAL AIR

## (undated) DEVICE — SKIN PREP TRAY W/CHG: Brand: MEDLINE INDUSTRIES, INC.

## (undated) DEVICE — INTENDED FOR TISSUE SEPARATION, AND OTHER PROCEDURES THAT REQUIRE A SHARP SURGICAL BLADE TO PUNCTURE OR CUT.: Brand: BARD-PARKER ® CARBON RIB-BACK BLADES

## (undated) DEVICE — DRESSING,GAUZE,XEROFORM,CURAD,1"X8",ST: Brand: CURAD

## (undated) DEVICE — PROBE ABLAT XL 90DEG ASPIR BPLR RF 1 PC ELECTRD ERGO HNDL

## (undated) DEVICE — MERCY HEALTH ST CHARLES: Brand: MEDLINE INDUSTRIES, INC.

## (undated) DEVICE — GLOVE SURG SZ 75 L12IN FNGR THK79MIL GRN LTX FREE

## (undated) DEVICE — SUTURE PROL SZ 3-0 L18IN NONABSORBABLE BLU L19MM PS-2 3/8 8687H

## (undated) DEVICE — BUR SHAVER 5 MMX13 CM 8 FLUT OVL FOR AGGRESSIVE BNE COOLCUT

## (undated) DEVICE — GOWN,SIRUS,NONRNF,SETINSLV,XL,20/CS: Brand: MEDLINE

## (undated) DEVICE — SUTURE PROL SZ 3-0 L18IN NONABSORBABLE BLU L24MM FS-1 3/8 8684G

## (undated) DEVICE — POUCH FLD 36X29IN SHLDR HVY LO GLARE MATTE FINISH FLM 2 SUCT

## (undated) DEVICE — SOLUTION IRRIG 1000ML STRL H2O USP PLAS POUR BTL

## (undated) DEVICE — 4-PORT MANIFOLD: Brand: NEPTUNE 2

## (undated) DEVICE — NEEDLE HYPO 25GA L1.5IN BLU POLYPR HUB S STL REG BVL STR